# Patient Record
Sex: MALE | Race: OTHER | HISPANIC OR LATINO
[De-identification: names, ages, dates, MRNs, and addresses within clinical notes are randomized per-mention and may not be internally consistent; named-entity substitution may affect disease eponyms.]

---

## 2019-11-20 ENCOUNTER — APPOINTMENT (OUTPATIENT)
Dept: INTERVENTIONAL RADIOLOGY/VASCULAR | Facility: HOSPITAL | Age: 54
End: 2019-11-20
Payer: COMMERCIAL

## 2019-11-20 ENCOUNTER — OUTPATIENT (OUTPATIENT)
Dept: OUTPATIENT SERVICES | Facility: HOSPITAL | Age: 54
LOS: 1 days | End: 2019-11-20
Payer: COMMERCIAL

## 2019-11-20 PROCEDURE — 36590 REMOVAL TUNNELED CV CATH: CPT

## 2020-03-24 ENCOUNTER — EMERGENCY (EMERGENCY)
Facility: HOSPITAL | Age: 55
LOS: 1 days | Discharge: HOME | End: 2020-03-24
Attending: EMERGENCY MEDICINE | Admitting: EMERGENCY MEDICINE
Payer: COMMERCIAL

## 2020-03-24 VITALS
TEMPERATURE: 100 F | RESPIRATION RATE: 17 BRPM | HEART RATE: 103 BPM | OXYGEN SATURATION: 95 % | SYSTOLIC BLOOD PRESSURE: 123 MMHG | DIASTOLIC BLOOD PRESSURE: 80 MMHG

## 2020-03-24 VITALS
OXYGEN SATURATION: 97 % | HEIGHT: 74 IN | RESPIRATION RATE: 19 BRPM | HEART RATE: 109 BPM | DIASTOLIC BLOOD PRESSURE: 90 MMHG | WEIGHT: 285.06 LBS | SYSTOLIC BLOOD PRESSURE: 126 MMHG | TEMPERATURE: 99 F

## 2020-03-24 DIAGNOSIS — Z91.09 OTHER ALLERGY STATUS, OTHER THAN TO DRUGS AND BIOLOGICAL SUBSTANCES: ICD-10-CM

## 2020-03-24 DIAGNOSIS — Z91.013 ALLERGY TO SEAFOOD: ICD-10-CM

## 2020-03-24 DIAGNOSIS — R10.11 RIGHT UPPER QUADRANT PAIN: ICD-10-CM

## 2020-03-24 DIAGNOSIS — J01.80 OTHER ACUTE SINUSITIS: ICD-10-CM

## 2020-03-24 DIAGNOSIS — Z91.041 RADIOGRAPHIC DYE ALLERGY STATUS: ICD-10-CM

## 2020-03-24 DIAGNOSIS — I10 ESSENTIAL (PRIMARY) HYPERTENSION: ICD-10-CM

## 2020-03-24 DIAGNOSIS — B34.9 VIRAL INFECTION, UNSPECIFIED: ICD-10-CM

## 2020-03-24 DIAGNOSIS — R63.0 ANOREXIA: ICD-10-CM

## 2020-03-24 LAB
ALBUMIN SERPL ELPH-MCNC: 3.8 G/DL — SIGNIFICANT CHANGE UP (ref 3.3–5)
ALBUMIN SERPL ELPH-MCNC: 3.9 G/DL — SIGNIFICANT CHANGE UP (ref 3.3–5)
ALP SERPL-CCNC: 84 U/L — SIGNIFICANT CHANGE UP (ref 40–120)
ALP SERPL-CCNC: SIGNIFICANT CHANGE UP U/L (ref 40–120)
ALT FLD-CCNC: 25 U/L — SIGNIFICANT CHANGE UP (ref 10–45)
ALT FLD-CCNC: SIGNIFICANT CHANGE UP U/L (ref 10–45)
ANION GAP SERPL CALC-SCNC: 12 MMOL/L — SIGNIFICANT CHANGE UP (ref 5–17)
ANION GAP SERPL CALC-SCNC: 13 MMOL/L — SIGNIFICANT CHANGE UP (ref 5–17)
AST SERPL-CCNC: 26 U/L — SIGNIFICANT CHANGE UP (ref 10–40)
AST SERPL-CCNC: SIGNIFICANT CHANGE UP U/L (ref 10–40)
BASOPHILS # BLD AUTO: 0.01 K/UL — SIGNIFICANT CHANGE UP (ref 0–0.2)
BASOPHILS NFR BLD AUTO: 0.2 % — SIGNIFICANT CHANGE UP (ref 0–2)
BILIRUB SERPL-MCNC: 0.4 MG/DL — SIGNIFICANT CHANGE UP (ref 0.2–1.2)
BILIRUB SERPL-MCNC: 0.5 MG/DL — SIGNIFICANT CHANGE UP (ref 0.2–1.2)
BUN SERPL-MCNC: 12 MG/DL — SIGNIFICANT CHANGE UP (ref 7–23)
BUN SERPL-MCNC: 13 MG/DL — SIGNIFICANT CHANGE UP (ref 7–23)
CALCIUM SERPL-MCNC: 8.3 MG/DL — LOW (ref 8.4–10.5)
CALCIUM SERPL-MCNC: 8.5 MG/DL — SIGNIFICANT CHANGE UP (ref 8.4–10.5)
CHLORIDE SERPL-SCNC: 106 MMOL/L — SIGNIFICANT CHANGE UP (ref 96–108)
CHLORIDE SERPL-SCNC: 107 MMOL/L — SIGNIFICANT CHANGE UP (ref 96–108)
CO2 SERPL-SCNC: 19 MMOL/L — LOW (ref 22–31)
CO2 SERPL-SCNC: 21 MMOL/L — LOW (ref 22–31)
CREAT SERPL-MCNC: 1.09 MG/DL — SIGNIFICANT CHANGE UP (ref 0.5–1.3)
CREAT SERPL-MCNC: 1.12 MG/DL — SIGNIFICANT CHANGE UP (ref 0.5–1.3)
EOSINOPHIL # BLD AUTO: 0.03 K/UL — SIGNIFICANT CHANGE UP (ref 0–0.5)
EOSINOPHIL NFR BLD AUTO: 0.7 % — SIGNIFICANT CHANGE UP (ref 0–6)
GLUCOSE SERPL-MCNC: 95 MG/DL — SIGNIFICANT CHANGE UP (ref 70–99)
GLUCOSE SERPL-MCNC: 97 MG/DL — SIGNIFICANT CHANGE UP (ref 70–99)
HCT VFR BLD CALC: 49.3 % — SIGNIFICANT CHANGE UP (ref 39–50)
HGB BLD-MCNC: 15.8 G/DL — SIGNIFICANT CHANGE UP (ref 13–17)
IMM GRANULOCYTES NFR BLD AUTO: 0.4 % — SIGNIFICANT CHANGE UP (ref 0–1.5)
LIDOCAIN IGE QN: 47 U/L — SIGNIFICANT CHANGE UP (ref 7–60)
LYMPHOCYTES # BLD AUTO: 1.39 K/UL — SIGNIFICANT CHANGE UP (ref 1–3.3)
LYMPHOCYTES # BLD AUTO: 31 % — SIGNIFICANT CHANGE UP (ref 13–44)
MCHC RBC-ENTMCNC: 24.8 PG — LOW (ref 27–34)
MCHC RBC-ENTMCNC: 32 GM/DL — SIGNIFICANT CHANGE UP (ref 32–36)
MCV RBC AUTO: 77.4 FL — LOW (ref 80–100)
MONOCYTES # BLD AUTO: 0.39 K/UL — SIGNIFICANT CHANGE UP (ref 0–0.9)
MONOCYTES NFR BLD AUTO: 8.7 % — SIGNIFICANT CHANGE UP (ref 2–14)
NEUTROPHILS # BLD AUTO: 2.65 K/UL — SIGNIFICANT CHANGE UP (ref 1.8–7.4)
NEUTROPHILS NFR BLD AUTO: 59 % — SIGNIFICANT CHANGE UP (ref 43–77)
NRBC # BLD: 0 /100 WBCS — SIGNIFICANT CHANGE UP (ref 0–0)
PLATELET # BLD AUTO: 188 K/UL — SIGNIFICANT CHANGE UP (ref 150–400)
POTASSIUM SERPL-MCNC: 4.5 MMOL/L — SIGNIFICANT CHANGE UP (ref 3.5–5.3)
POTASSIUM SERPL-MCNC: SIGNIFICANT CHANGE UP MMOL/L (ref 3.5–5.3)
POTASSIUM SERPL-SCNC: 4.5 MMOL/L — SIGNIFICANT CHANGE UP (ref 3.5–5.3)
POTASSIUM SERPL-SCNC: SIGNIFICANT CHANGE UP MMOL/L (ref 3.5–5.3)
PROT SERPL-MCNC: 7.7 G/DL — SIGNIFICANT CHANGE UP (ref 6–8.3)
PROT SERPL-MCNC: 8 G/DL — SIGNIFICANT CHANGE UP (ref 6–8.3)
RBC # BLD: 6.37 M/UL — HIGH (ref 4.2–5.8)
RBC # FLD: 15.8 % — HIGH (ref 10.3–14.5)
SODIUM SERPL-SCNC: 138 MMOL/L — SIGNIFICANT CHANGE UP (ref 135–145)
SODIUM SERPL-SCNC: 140 MMOL/L — SIGNIFICANT CHANGE UP (ref 135–145)
WBC # BLD: 4.49 K/UL — SIGNIFICANT CHANGE UP (ref 3.8–10.5)
WBC # FLD AUTO: 4.49 K/UL — SIGNIFICANT CHANGE UP (ref 3.8–10.5)

## 2020-03-24 PROCEDURE — 83690 ASSAY OF LIPASE: CPT

## 2020-03-24 PROCEDURE — 76705 ECHO EXAM OF ABDOMEN: CPT | Mod: 26

## 2020-03-24 PROCEDURE — 96374 THER/PROPH/DIAG INJ IV PUSH: CPT

## 2020-03-24 PROCEDURE — 85025 COMPLETE CBC W/AUTO DIFF WBC: CPT

## 2020-03-24 PROCEDURE — 76705 ECHO EXAM OF ABDOMEN: CPT

## 2020-03-24 PROCEDURE — 36415 COLL VENOUS BLD VENIPUNCTURE: CPT

## 2020-03-24 PROCEDURE — 99284 EMERGENCY DEPT VISIT MOD MDM: CPT | Mod: 25

## 2020-03-24 PROCEDURE — 99284 EMERGENCY DEPT VISIT MOD MDM: CPT

## 2020-03-24 PROCEDURE — 80053 COMPREHEN METABOLIC PANEL: CPT

## 2020-03-24 RX ORDER — ONDANSETRON 8 MG/1
4 TABLET, FILM COATED ORAL ONCE
Refills: 0 | Status: COMPLETED | OUTPATIENT
Start: 2020-03-24 | End: 2020-03-24

## 2020-03-24 RX ORDER — CLARITHROMYCIN 500 MG
2 TABLET ORAL
Qty: 6 | Refills: 0
Start: 2020-03-24 | End: 2020-03-28

## 2020-03-24 RX ORDER — SODIUM CHLORIDE 9 MG/ML
1000 INJECTION INTRAMUSCULAR; INTRAVENOUS; SUBCUTANEOUS ONCE
Refills: 0 | Status: COMPLETED | OUTPATIENT
Start: 2020-03-24 | End: 2020-03-24

## 2020-03-24 RX ADMIN — SODIUM CHLORIDE 1000 MILLILITER(S): 9 INJECTION INTRAMUSCULAR; INTRAVENOUS; SUBCUTANEOUS at 09:47

## 2020-03-24 RX ADMIN — ONDANSETRON 4 MILLIGRAM(S): 8 TABLET, FILM COATED ORAL at 10:23

## 2020-03-24 NOTE — ED PROVIDER NOTE - NSFOLLOWUPINSTRUCTIONS_ED_ALL_ED_FT
Viral Respiratory Infection    A viral respiratory infection is an illness that affects parts of the body used for breathing, like the lungs, nose, and throat. It is caused by a germ called a virus. Symptoms can include runny nose, coughing, sneezing, fatigue, body aches, sore throat, fever, or headache. Over the counter medicine can be used to manage the symptoms but the infection typically goes away on its own in 5 to 10 days.     SEEK IMMEDIATE MEDICAL CARE IF YOU HAVE ANY OF THE FOLLOWING SYMPTOMS: shortness of breath, chest pain, fever over 10 days, or lightheadedness/dizziness.     Sinusitis, Adult  Sinusitis is inflammation of your sinuses. Sinuses are hollow spaces in the bones around your face. Your sinuses are located:  Around your eyes.In the middle of your forehead.Behind your nose.In your cheekbones.Mucus normally drains out of your sinuses. When your nasal tissues become inflamed or swollen, mucus can become trapped or blocked. This allows bacteria, viruses, and fungi to grow, which leads to infection. Most infections of the sinuses are caused by a virus.  Sinusitis can develop quickly. It can last for up to 4 weeks (acute) or for more than 12 weeks (chronic). Sinusitis often develops after a cold.  What are the causes?  This condition is caused by anything that creates swelling in the sinuses or stops mucus from draining. This includes:  Allergies.Asthma.Infection from bacteria or viruses.Deformities or blockages in your nose or sinuses.Abnormal growths in the nose (nasal polyps).Pollutants, such as chemicals or irritants in the air.Infection from fungi (rare).What increases the risk?  You are more likely to develop this condition if you:  Have a weak body defense system (immune system).Do a lot of swimming or diving.Overuse nasal sprays.Smoke.What are the signs or symptoms?  The main symptoms of this condition are pain and a feeling of pressure around the affected sinuses. Other symptoms include:  Stuffy nose or congestion.Thick drainage from your nose.Swelling and warmth over the affected sinuses.Headache.Upper toothache.A cough that may get worse at night.Extra mucus that collects in the throat or the back of the nose (postnasal drip).Decreased sense of smell and taste.Fatigue.A fever.Sore throat.Bad breath.How is this diagnosed?  This condition is diagnosed based on:  Your symptoms.Your medical history.A physical exam.Tests to find out if your condition is acute or chronic. This may include:  Checking your nose for nasal polyps.Viewing your sinuses using a device that has a light (endoscope).Testing for allergies or bacteria.Imaging tests, such as an MRI or CT scan.In rare cases, a bone biopsy may be done to rule out more serious types of fungal sinus disease.  How is this treated?  Treatment for sinusitis depends on the cause and whether your condition is chronic or acute.  If caused by a virus, your symptoms should go away on their own within 10 days. You may be given medicines to relieve symptoms. They include:  Medicines that shrink swollen nasal passages (topical intranasal decongestants). Medicines that treat allergies (antihistamines).A spray that eases inflammation of the nostrils (topical intranasal corticosteroids).Rinses that help get rid of thick mucus in your nose (nasal saline washes).If caused by bacteria, your health care provider may recommend waiting to see if your symptoms improve. Most bacterial infections will get better without antibiotic medicine. You may be given antibiotics if you have:  A severe infection. A weak immune system.If caused by narrow nasal passages or nasal polyps, you may need to have surgery.Follow these instructions at home:  Medicines     Take, use, or apply over-the-counter and prescription medicines only as told by your health care provider. These may include nasal sprays.If you were prescribed an antibiotic medicine, take it as told by your health care provider. Do not stop taking the antibiotic even if you start to feel better.Hydrate and humidify        Drink enough fluid to keep your urine pale yellow. Staying hydrated will help to thin your mucus.Use a cool mist humidifier to keep the humidity level in your home above 50%.Inhale steam for 10–15 minutes, 3–4 times a day, or as told by your health care provider. You can do this in the bathroom while a hot shower is running.Limit your exposure to cool or dry air.Rest     Rest as much as possible.Sleep with your head raised (elevated).Make sure you get enough sleep each night.General instructions        Apply a warm, moist washcloth to your face 3–4 times a day or as told by your health care provider. This will help with discomfort.Wash your hands often with soap and water to reduce your exposure to germs. If soap and water are not available, use hand .Do not smoke. Avoid being around people who are smoking (secondhand smoke).Keep all follow-up visits as told by your health care provider. This is important.Contact a health care provider if:  You have a fever.Your symptoms get worse.Your symptoms do not improve within 10 days.Get help right away if:  You have a severe headache.You have persistent vomiting.You have severe pain or swelling around your face or eyes.You have vision problems.You develop confusion.Your neck is stiff.You have trouble breathing.Summary  Sinusitis is soreness and inflammation of your sinuses. Sinuses are hollow spaces in the bones around your face.This condition is caused by nasal tissues that become inflamed or swollen. The swelling traps or blocks the flow of mucus. This allows bacteria, viruses, and fungi to grow, which leads to infection.If you were prescribed an antibiotic medicine, take it as told by your health care provider. Do not stop taking the antibiotic even if you start to feel better.Keep all follow-up visits as told by your health care provider. This is important.This information is not intended to replace advice given to you by your health care provider. Make sure you discuss any questions you have with your health care provider.    Document Released: 12/18/2006 Document Revised: 05/20/2019 Document Reviewed: 05/20/2019  [email or web address] Interactive Patient Education © 2020 [email or web address]    Follow up with your doctor in 1 week.    Return for shortness of breath, chest pain, weakness, worsening of your condition.

## 2020-03-24 NOTE — ED PROVIDER NOTE - PATIENT PORTAL LINK FT
You can access the FollowMyHealth Patient Portal offered by [facility] by registering at the following website: [email or web address]. By joining [employer]’s FollowMyHealth portal, you will also be able to view your health information using other applications (apps) compatible with our system.

## 2020-03-24 NOTE — ED ADULT NURSE NOTE - OBJECTIVE STATEMENT
Pt presents to ED with c/o R sided abdominal pain x six days, states he cannot keep anything down, denies diarrhea, fever, cough, chest pain, SOB, hx of abdominal surgeries.

## 2020-03-24 NOTE — ED ADULT TRIAGE NOTE - CHIEF COMPLAINT QUOTE
"I have been having abd pain, nausea, vomiting, chills and fevers for about 1 week, there is a mild cough too".

## 2020-03-24 NOTE — ED PROVIDER NOTE - OBJECTIVE STATEMENT
53 y/o M with PMHx HTN, heartburn and lymphoma in 2015 (currently in remission), presents to the ED with decreased PO intake, nausea with eating, dry heaving and constant RUQ abd pain with the feeling of a "rock" in his RUQ x 6 days. Pt also notes chills, but denies fever, CP, SOB, cough, or diarrhea. No past abdominal surgeries. No smoking hx or EtOH use.

## 2020-03-24 NOTE — ED PROVIDER NOTE - CLINICAL SUMMARY MEDICAL DECISION MAKING FREE TEXT BOX
53 y/o M presenting to the ED with 6 days of decreased PO intake, nausea after meals and feeling of a "rock" in his RUQ. Pt is afebrile, VSS, pt appears well. Tenderness to RUQ, concerning for cholelithiasis vs acute cholecystitis vs other hepatobiliary pathology. Plan for labs, RUQ sono, IV fluids, Zofran and reassess. Do not suspect COVID-19.

## 2020-03-24 NOTE — ED PROVIDER NOTE - CARE PLAN
Principal Discharge DX:	Viral syndrome  Secondary Diagnosis:	Acute non-recurrent sinusitis of other sinus

## 2020-03-24 NOTE — ED ADULT NURSE NOTE - CAS TRG GENERAL AIRWAY, MLM
Prescription approved per [facility] Refill Protocol.  Jeffery Thomas RN     Patent

## 2020-03-24 NOTE — ED PROVIDER NOTE - GASTROINTESTINAL, MLM
Abdomen soft, obese, focal tenderness to RUQ, no guarding, no rebound, no palpable masses, no hernias, no surgical scars.

## 2020-09-25 ENCOUNTER — EMERGENCY (EMERGENCY)
Facility: HOSPITAL | Age: 55
LOS: 1 days | Discharge: HOME | End: 2020-09-25
Attending: EMERGENCY MEDICINE | Admitting: EMERGENCY MEDICINE
Payer: COMMERCIAL

## 2020-09-25 VITALS
RESPIRATION RATE: 18 BRPM | WEIGHT: 289.91 LBS | OXYGEN SATURATION: 97 % | SYSTOLIC BLOOD PRESSURE: 115 MMHG | HEIGHT: 74 IN | TEMPERATURE: 98 F | HEART RATE: 96 BPM | DIASTOLIC BLOOD PRESSURE: 75 MMHG

## 2020-09-25 PROCEDURE — 99283 EMERGENCY DEPT VISIT LOW MDM: CPT | Mod: 25

## 2020-09-25 PROCEDURE — 90471 IMMUNIZATION ADMIN: CPT

## 2020-09-25 PROCEDURE — 12001 RPR S/N/AX/GEN/TRNK 2.5CM/<: CPT

## 2020-09-25 PROCEDURE — 90715 TDAP VACCINE 7 YRS/> IM: CPT

## 2020-09-25 RX ORDER — BACITRACIN ZINC 500 UNIT/G
1 OINTMENT IN PACKET (EA) TOPICAL ONCE
Refills: 0 | Status: COMPLETED | OUTPATIENT
Start: 2020-09-25 | End: 2020-09-25

## 2020-09-25 RX ORDER — LIDOCAINE HCL 20 MG/ML
5 VIAL (ML) INJECTION ONCE
Refills: 0 | Status: COMPLETED | OUTPATIENT
Start: 2020-09-25 | End: 2020-09-25

## 2020-09-25 RX ORDER — TETANUS TOXOID, REDUCED DIPHTHERIA TOXOID AND ACELLULAR PERTUSSIS VACCINE, ADSORBED 5; 2.5; 8; 8; 2.5 [IU]/.5ML; [IU]/.5ML; UG/.5ML; UG/.5ML; UG/.5ML
0.5 SUSPENSION INTRAMUSCULAR ONCE
Refills: 0 | Status: COMPLETED | OUTPATIENT
Start: 2020-09-25 | End: 2020-09-25

## 2020-09-25 RX ADMIN — TETANUS TOXOID, REDUCED DIPHTHERIA TOXOID AND ACELLULAR PERTUSSIS VACCINE, ADSORBED 0.5 MILLILITER(S): 5; 2.5; 8; 8; 2.5 SUSPENSION INTRAMUSCULAR at 19:08

## 2020-09-25 RX ADMIN — Medication 1 APPLICATION(S): at 19:21

## 2020-09-25 RX ADMIN — Medication 5 MILLILITER(S): at 19:21

## 2020-09-25 NOTE — ED PROVIDER NOTE - OBJECTIVE STATEMENT
55 y/o male who is right hand dominant is present c/o laceration to his left index finger. He sustained injury approximately 30 minutes prior to ER arrival. He was cleaning a knife when he accidently cut his finger. He was able to control the bleeding prior to arrival with pressure, elevation and paper towels. He denies the following: numbness/tingling sensation, and last tdap is unknown.

## 2020-09-25 NOTE — ED PROVIDER NOTE - NSFOLLOWUPINSTRUCTIONS_ED_ALL_ED_FT
Please keep bandages on for the next 24-48 hours and apply bacitracin twice daily to wound. Please return to the ED in 7 days for suture removal. Return to the Emergency Department if you have any new or worsening symptoms, or if you have any concerns.          Laceration    WHAT YOU NEED TO KNOW:    A laceration is an injury to the skin and the soft tissue underneath it. Lacerations can happen anywhere on the body.    DISCHARGE INSTRUCTIONS:    Return to the emergency department if:   •You have heavy bleeding or bleeding that does not stop after 10 minutes of holding firm, direct pressure over the wound.      •Your wound opens up.      Call your doctor if:   •You have a fever or chills.      •Your laceration is red, warm, or swollen.      •You have red streaks on your skin coming from your wound.      •You have white or yellow drainage from the wound that smells bad.      •You have pain that gets worse, even after treatment.      •You have questions or concerns about your condition or care.      Medicines: You may need any of the following:   •Prescription pain medicine may be given. Ask your healthcare provider how to take this medicine safely. Some prescription pain medicines contain acetaminophen. Do not take other medicines that contain acetaminophen without talking to your healthcare provider. Too much acetaminophen may cause liver damage. Prescription pain medicine may cause constipation. Ask your healthcare provider how to prevent or treat constipation.       •Antibiotics help treat or prevent a bacterial infection.      •Take your medicine as directed. Contact your healthcare provider if you think your medicine is not helping or if you have side effects. Tell him or her if you are allergic to any medicine. Keep a list of the medicines, vitamins, and herbs you take. Include the amounts, and when and why you take them. Bring the list or the pill bottles to follow-up visits. Carry your medicine list with you in case of an emergency.      Care for your wound as directed:   •Do not get your wound wet until your healthcare provider says it is okay. Do not soak your wound in water. Do not go swimming until your healthcare provider says it is okay. Carefully wash the wound with soap and water. Gently pat the area dry or allow it to air dry.      •Change your bandages when they get wet, dirty, or after washing. Apply new, clean bandages as directed. Do not apply elastic bandages or tape too tight. Do not put powders or lotions over your incision.      •Apply antibiotic ointment as directed. Your healthcare provider may give you antibiotic ointment to put over your wound if you have stitches. If you have strips of tape over your incision, let them dry up and fall off on their own. If they do not fall off within 14 days, gently remove them. If you have glue over your wound, do not remove or pick at it. If your glue comes off, do not replace it with glue that you have at home.      •Check your wound every day for signs of infection, such as swelling, redness, or pus.      Self-care:   •Apply ice on your wound for 15 to 20 minutes every hour or as directed. Use an ice pack, or put crushed ice in a plastic bag. Cover it with a towel. Ice helps prevent tissue damage and decreases swelling and pain.      •Use a splint as directed. A splint will decrease movement and stress on your wound. It may help it heal faster. A splint may be used for lacerations over joints or areas of your body that bend. Ask your healthcare provider how to apply and remove a splint.      •Decrease scarring of your wound by applying ointments as directed. Do not apply ointments until your healthcare provider says it is okay. You may need to wait until your wound is healed. Ask which ointment to buy and how often to use it. After your wound is healed, use sunscreen over the area when you are out in the sun. You should do this for at least 6 months to 1 year after your injury.      Follow up with your doctor as directed: You may need to follow up in 24 to 48 hours to have your wound checked for infection. You will need to return in 3 to 14 days if you have stitches or staples so they can be removed. Care for your wound as directed to prevent infection and help it heal. Write down your questions so you remember to ask them during your visits.       © Copyright [employer] 2020           back to top                          © Copyright [employer] 2020

## 2020-09-25 NOTE — ED PROVIDER NOTE - PSYCHIATRIC, MLM
Appointment made   Alert and oriented to person, place, time/situation. normal mood and affect. no apparent risk to self or others.

## 2020-09-25 NOTE — ED PROVIDER NOTE - ATTENDING CONTRIBUTION TO CARE
54 year old male presents to ED with concern for laceration to left hand.  He was cleaning a knife when he accidently cut himself.  On my face to face ED eval, patient with 2 cm laceration of left palmar aspect of index finger - linear; hemostasis achieved.  Neurovasc status intact, cap refill <5 seconds.  Will update tetanus, repair wound and dispo accordingly.

## 2020-09-25 NOTE — ED ADULT NURSE NOTE - OBJECTIVE STATEMENT
Pt is a 55 y/o male A&Ox4 in NAD ambulatory with steady gait c/o laceration to left second finger while cleaning his knife. Bleeding controlled with dressing.

## 2020-09-25 NOTE — ED PROVIDER NOTE - CLINICAL SUMMARY MEDICAL DECISION MAKING FREE TEXT BOX
55 y/o male with 2 cm laceration located on the palmar aspect of the left index finger sustained with a knife at home. Not intentional. Good rom with flexion/extension. Do not suspect tendon injury. Repair with x5 stitches. Given wound care instructions and follow up in 7 days for removal. I have discussed the discharge plan with the patient. The patient agrees with the plan, as discussed.  The patient understands Emergency Department diagnosis is a preliminary diagnosis often based on limited information and that the patient must adhere to the follow-up plan as discussed.  The patient understands that if the symptoms worsen or if prescribed medications do not have the desired/planned effect that the patient may return to the Emergency Department at any time for further evaluation and treatment.

## 2020-09-25 NOTE — ED PROVIDER NOTE - PATIENT PORTAL LINK FT
You can access the FollowMyHealth Patient Portal offered by [facility] by registering at the following website: [email or web address]. By joining [employer]’s FollowMyHealth portal, you will also be able to view your health information using other applications (apps) compatible with our system.

## 2020-09-25 NOTE — ED ADULT TRIAGE NOTE - HEIGHT IN FEET
Quality 226: Preventive Care And Screening: Tobacco Use: Screening And Cessation Intervention: Patient screened for tobacco and never smoked Quality 110: Preventive Care And Screening: Influenza Immunization: Influenza Immunization Administered during Influenza season Quality 111:Pneumonia Vaccination Status For Older Adults: Pneumococcal Vaccination not Administered or Previously Received, Reason not Otherwise Specified Quality 131: Pain Assessment And Follow-Up: Pain assessment using a standardized tool is documented as negative, no follow-up plan required Detail Level: Detailed Quality 431: Preventive Care And Screening: Unhealthy Alcohol Use - Screening: Patient screened for unhealthy alcohol use using a single question and scores less than 2 times per year Quality 130: Documentation Of Current Medications In The Medical Record: Current Medications Documented 6

## 2020-09-25 NOTE — ED PROVIDER NOTE - CARE PLAN
Principal Discharge DX:	Laceration of left index finger without foreign body without damage to nail, initial encounter

## 2020-09-26 PROBLEM — C85.90 NON-HODGKIN LYMPHOMA, UNSPECIFIED, UNSPECIFIED SITE: Chronic | Status: ACTIVE | Noted: 2020-03-24

## 2020-09-26 PROBLEM — I10 ESSENTIAL (PRIMARY) HYPERTENSION: Chronic | Status: ACTIVE | Noted: 2020-03-24

## 2020-09-29 DIAGNOSIS — I10 ESSENTIAL (PRIMARY) HYPERTENSION: ICD-10-CM
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
## 2020-09-29 DIAGNOSIS — Z91.041 RADIOGRAPHIC DYE ALLERGY STATUS: ICD-10-CM

## 2020-09-29 DIAGNOSIS — Z79.899 OTHER LONG TERM (CURRENT) DRUG THERAPY: ICD-10-CM

## 2020-09-29 DIAGNOSIS — Z23 ENCOUNTER FOR IMMUNIZATION: ICD-10-CM

## 2020-09-29 DIAGNOSIS — S61.211A LACERATION WITHOUT FOREIGN BODY OF LEFT INDEX FINGER WITHOUT DAMAGE TO NAIL, INITIAL ENCOUNTER: ICD-10-CM

## 2020-09-29 DIAGNOSIS — Z91.013 ALLERGY TO SEAFOOD: ICD-10-CM
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
## 2021-05-17 NOTE — ED PROVIDER NOTE - RELIEVING FACTORS
1.  Patient is to continue his current diet, medication and activity. 2.  Patient is to push fluids. 3.  I will obtain a chest x-ray on the patient. 4.  I will obtain a CBC and BMP on the patient. 5.  I will obtain a nasal swab to check for Covid.   6. none

## 2021-05-26 ENCOUNTER — INPATIENT (INPATIENT)
Facility: HOSPITAL | Age: 56
LOS: 6 days | Discharge: HOME | DRG: 392 | End: 2021-06-02
Attending: SURGERY | Admitting: SURGERY
Payer: COMMERCIAL

## 2021-05-26 VITALS
DIASTOLIC BLOOD PRESSURE: 82 MMHG | RESPIRATION RATE: 18 BRPM | SYSTOLIC BLOOD PRESSURE: 117 MMHG | WEIGHT: 300.05 LBS | HEART RATE: 96 BPM | TEMPERATURE: 98 F | OXYGEN SATURATION: 97 % | HEIGHT: 74 IN

## 2021-05-26 LAB
ALBUMIN SERPL ELPH-MCNC: 4.2 G/DL — SIGNIFICANT CHANGE UP (ref 3.3–5)
ALP SERPL-CCNC: 85 U/L — SIGNIFICANT CHANGE UP (ref 40–120)
ALT FLD-CCNC: 29 U/L — SIGNIFICANT CHANGE UP (ref 10–45)
ANION GAP SERPL CALC-SCNC: 14 MMOL/L — SIGNIFICANT CHANGE UP (ref 5–17)
APPEARANCE UR: CLEAR — SIGNIFICANT CHANGE UP
AST SERPL-CCNC: 30 U/L — SIGNIFICANT CHANGE UP (ref 10–40)
BACTERIA # UR AUTO: PRESENT /HPF
BASOPHILS # BLD AUTO: 0.02 K/UL — SIGNIFICANT CHANGE UP (ref 0–0.2)
BASOPHILS NFR BLD AUTO: 0.4 % — SIGNIFICANT CHANGE UP (ref 0–2)
BILIRUB SERPL-MCNC: 0.6 MG/DL — SIGNIFICANT CHANGE UP (ref 0.2–1.2)
BILIRUB UR-MCNC: NEGATIVE — SIGNIFICANT CHANGE UP
BUN SERPL-MCNC: 15 MG/DL — SIGNIFICANT CHANGE UP (ref 7–23)
CALCIUM SERPL-MCNC: 8.9 MG/DL — SIGNIFICANT CHANGE UP (ref 8.4–10.5)
CHLORIDE SERPL-SCNC: 104 MMOL/L — SIGNIFICANT CHANGE UP (ref 96–108)
CO2 SERPL-SCNC: 23 MMOL/L — SIGNIFICANT CHANGE UP (ref 22–31)
COLOR SPEC: YELLOW — SIGNIFICANT CHANGE UP
CREAT SERPL-MCNC: 1.12 MG/DL — SIGNIFICANT CHANGE UP (ref 0.5–1.3)
DIFF PNL FLD: NEGATIVE — SIGNIFICANT CHANGE UP
EOSINOPHIL # BLD AUTO: 0.01 K/UL — SIGNIFICANT CHANGE UP (ref 0–0.5)
EOSINOPHIL NFR BLD AUTO: 0.2 % — SIGNIFICANT CHANGE UP (ref 0–6)
EPI CELLS # UR: SIGNIFICANT CHANGE UP /HPF (ref 0–5)
GLUCOSE SERPL-MCNC: 98 MG/DL — SIGNIFICANT CHANGE UP (ref 70–99)
GLUCOSE UR QL: NEGATIVE — SIGNIFICANT CHANGE UP
HCT VFR BLD CALC: 43.9 % — SIGNIFICANT CHANGE UP (ref 39–50)
HGB BLD-MCNC: 14.6 G/DL — SIGNIFICANT CHANGE UP (ref 13–17)
IMM GRANULOCYTES NFR BLD AUTO: 0 % — SIGNIFICANT CHANGE UP (ref 0–1.5)
KETONES UR-MCNC: 15 MG/DL
LEUKOCYTE ESTERASE UR-ACNC: NEGATIVE — SIGNIFICANT CHANGE UP
LIDOCAIN IGE QN: 37 U/L — SIGNIFICANT CHANGE UP (ref 7–60)
LYMPHOCYTES # BLD AUTO: 1.55 K/UL — SIGNIFICANT CHANGE UP (ref 1–3.3)
LYMPHOCYTES # BLD AUTO: 28.3 % — SIGNIFICANT CHANGE UP (ref 13–44)
MCHC RBC-ENTMCNC: 25.7 PG — LOW (ref 27–34)
MCHC RBC-ENTMCNC: 33.3 GM/DL — SIGNIFICANT CHANGE UP (ref 32–36)
MCV RBC AUTO: 77.3 FL — LOW (ref 80–100)
MONOCYTES # BLD AUTO: 0.54 K/UL — SIGNIFICANT CHANGE UP (ref 0–0.9)
MONOCYTES NFR BLD AUTO: 9.9 % — SIGNIFICANT CHANGE UP (ref 2–14)
NEUTROPHILS # BLD AUTO: 3.35 K/UL — SIGNIFICANT CHANGE UP (ref 1.8–7.4)
NEUTROPHILS NFR BLD AUTO: 61.2 % — SIGNIFICANT CHANGE UP (ref 43–77)
NITRITE UR-MCNC: NEGATIVE — SIGNIFICANT CHANGE UP
NRBC # BLD: 0 /100 WBCS — SIGNIFICANT CHANGE UP (ref 0–0)
PH UR: 6 — SIGNIFICANT CHANGE UP (ref 5–8)
PLATELET # BLD AUTO: 178 K/UL — SIGNIFICANT CHANGE UP (ref 150–400)
POTASSIUM SERPL-MCNC: 4.2 MMOL/L — SIGNIFICANT CHANGE UP (ref 3.5–5.3)
POTASSIUM SERPL-SCNC: 4.2 MMOL/L — SIGNIFICANT CHANGE UP (ref 3.5–5.3)
PROT SERPL-MCNC: 8.1 G/DL — SIGNIFICANT CHANGE UP (ref 6–8.3)
PROT UR-MCNC: ABNORMAL MG/DL
RBC # BLD: 5.68 M/UL — SIGNIFICANT CHANGE UP (ref 4.2–5.8)
RBC # FLD: 15.2 % — HIGH (ref 10.3–14.5)
RBC CASTS # UR COMP ASSIST: < 5 /HPF — SIGNIFICANT CHANGE UP
SARS-COV-2 RNA SPEC QL NAA+PROBE: SIGNIFICANT CHANGE UP
SODIUM SERPL-SCNC: 141 MMOL/L — SIGNIFICANT CHANGE UP (ref 135–145)
SP GR SPEC: 1.02 — SIGNIFICANT CHANGE UP (ref 1–1.03)
URATE CRY FLD QL MICRO: ABNORMAL /HPF
UROBILINOGEN FLD QL: 0.2 E.U./DL — SIGNIFICANT CHANGE UP
WBC # BLD: 5.47 K/UL — SIGNIFICANT CHANGE UP (ref 3.8–10.5)
WBC # FLD AUTO: 5.47 K/UL — SIGNIFICANT CHANGE UP (ref 3.8–10.5)
WBC UR QL: < 5 /HPF — SIGNIFICANT CHANGE UP

## 2021-05-26 PROCEDURE — 99285 EMERGENCY DEPT VISIT HI MDM: CPT

## 2021-05-26 PROCEDURE — 93010 ELECTROCARDIOGRAM REPORT: CPT

## 2021-05-26 PROCEDURE — 76705 ECHO EXAM OF ABDOMEN: CPT | Mod: 26

## 2021-05-26 RX ORDER — SODIUM CHLORIDE 9 MG/ML
1000 INJECTION INTRAMUSCULAR; INTRAVENOUS; SUBCUTANEOUS ONCE
Refills: 0 | Status: COMPLETED | OUTPATIENT
Start: 2021-05-26 | End: 2021-05-26

## 2021-05-26 RX ORDER — ATORVASTATIN CALCIUM 80 MG/1
20 TABLET, FILM COATED ORAL AT BEDTIME
Refills: 0 | Status: DISCONTINUED | OUTPATIENT
Start: 2021-05-26 | End: 2021-06-02

## 2021-05-26 RX ORDER — SODIUM CHLORIDE 9 MG/ML
1000 INJECTION, SOLUTION INTRAVENOUS
Refills: 0 | Status: DISCONTINUED | OUTPATIENT
Start: 2021-05-26 | End: 2021-06-02

## 2021-05-26 RX ORDER — ONDANSETRON 8 MG/1
4 TABLET, FILM COATED ORAL EVERY 6 HOURS
Refills: 0 | Status: DISCONTINUED | OUTPATIENT
Start: 2021-05-26 | End: 2021-06-02

## 2021-05-26 RX ORDER — ATORVASTATIN CALCIUM 80 MG/1
1 TABLET, FILM COATED ORAL
Qty: 0 | Refills: 0 | DISCHARGE

## 2021-05-26 RX ORDER — HEPARIN SODIUM 5000 [USP'U]/ML
7500 INJECTION INTRAVENOUS; SUBCUTANEOUS EVERY 8 HOURS
Refills: 0 | Status: DISCONTINUED | OUTPATIENT
Start: 2021-05-26 | End: 2021-06-02

## 2021-05-26 RX ORDER — PANTOPRAZOLE SODIUM 20 MG/1
40 TABLET, DELAYED RELEASE ORAL
Refills: 0 | Status: DISCONTINUED | OUTPATIENT
Start: 2021-05-26 | End: 2021-05-26

## 2021-05-26 RX ORDER — MORPHINE SULFATE 50 MG/1
4 CAPSULE, EXTENDED RELEASE ORAL ONCE
Refills: 0 | Status: DISCONTINUED | OUTPATIENT
Start: 2021-05-26 | End: 2021-05-26

## 2021-05-26 RX ORDER — ACETAMINOPHEN 500 MG
650 TABLET ORAL EVERY 6 HOURS
Refills: 0 | Status: DISCONTINUED | OUTPATIENT
Start: 2021-05-26 | End: 2021-06-02

## 2021-05-26 RX ORDER — PANTOPRAZOLE SODIUM 20 MG/1
40 TABLET, DELAYED RELEASE ORAL DAILY
Refills: 0 | Status: DISCONTINUED | OUTPATIENT
Start: 2021-05-26 | End: 2021-05-28

## 2021-05-26 RX ORDER — ONDANSETRON 8 MG/1
4 TABLET, FILM COATED ORAL ONCE
Refills: 0 | Status: COMPLETED | OUTPATIENT
Start: 2021-05-26 | End: 2021-05-26

## 2021-05-26 RX ADMIN — PANTOPRAZOLE SODIUM 40 MILLIGRAM(S): 20 TABLET, DELAYED RELEASE ORAL at 22:45

## 2021-05-26 RX ADMIN — HEPARIN SODIUM 7500 UNIT(S): 5000 INJECTION INTRAVENOUS; SUBCUTANEOUS at 22:45

## 2021-05-26 RX ADMIN — SODIUM CHLORIDE 1000 MILLILITER(S): 9 INJECTION INTRAMUSCULAR; INTRAVENOUS; SUBCUTANEOUS at 07:38

## 2021-05-26 RX ADMIN — HEPARIN SODIUM 7500 UNIT(S): 5000 INJECTION INTRAVENOUS; SUBCUTANEOUS at 15:05

## 2021-05-26 RX ADMIN — ONDANSETRON 4 MILLIGRAM(S): 8 TABLET, FILM COATED ORAL at 07:55

## 2021-05-26 RX ADMIN — MORPHINE SULFATE 4 MILLIGRAM(S): 50 CAPSULE, EXTENDED RELEASE ORAL at 07:55

## 2021-05-26 RX ADMIN — MORPHINE SULFATE 4 MILLIGRAM(S): 50 CAPSULE, EXTENDED RELEASE ORAL at 15:05

## 2021-05-26 RX ADMIN — SODIUM CHLORIDE 1000 MILLILITER(S): 9 INJECTION INTRAMUSCULAR; INTRAVENOUS; SUBCUTANEOUS at 10:15

## 2021-05-26 RX ADMIN — ATORVASTATIN CALCIUM 20 MILLIGRAM(S): 80 TABLET, FILM COATED ORAL at 22:45

## 2021-05-26 RX ADMIN — SODIUM CHLORIDE 176 MILLILITER(S): 9 INJECTION, SOLUTION INTRAVENOUS at 15:05

## 2021-05-26 NOTE — PATIENT PROFILE ADULT - BILL PAYMENT
"Physical Therapy  Treatment    Rosa Phillips   [phone]    Loc Caicedo, PT   5/24/2019   no

## 2021-05-26 NOTE — ED ADULT TRIAGE NOTE - CHIEF COMPLAINT QUOTE
Fever (max temp of 101) and headache since Sunday. RUQ abd pain with radiation to R flank, N/V and malaise since Monday.

## 2021-05-26 NOTE — H&P ADULT - ATTENDING COMMENTS
Patient seen and examined, imaging reviewed.  Mild TTP in RUQ.  Normal WBC, LFTs  No gallstones on USG, CBD dilated to 9mm    A/P: RUQ pain of unclear etiology (chronic).  No evidence of cholelithiasis or cholecystitis, but has dilated CBD.  1. MRI to evaluate pancreas and CBD.  patient states he is anxious in MRIs.  Will use premedication with Ativan.   2. If unsuccessful, obtain CTAP pancreatic protocol.  3. NPO, IVF  4. No abx.

## 2021-05-26 NOTE — ED ADULT NURSE NOTE - NSIMPLEMENTINTERV_GEN_ALL_ED
Implemented All Universal Safety Interventions:  [place] to call system. Call bell, personal items and telephone within reach. Instruct patient to call for assistance. Room bathroom lighting operational. Non-slip footwear when patient is off stretcher. Physically safe environment: no spills, clutter or unnecessary equipment. Stretcher in lowest position, wheels locked, appropriate side rails in place. Benzoyl Peroxide Counseling: Patient counseled that medicine may cause skin irritation and bleach clothing.  In the event of skin irritation, the patient was advised to reduce the amount of the drug applied or use it less frequently.   The patient verbalized understanding of the proper use and possible adverse effects of benzoyl peroxide.  All of the patient's questions and concerns were addressed.

## 2021-05-26 NOTE — H&P ADULT - ASSESSMENT
54 y/o male with PMH s/f Lmphoma in remission(s/p chemo), GERD, HLD presenting to ED with c/o RUQ pain of 4 days duration. Sharp pain to RUQ with nausea and vomiting. RUQ today s/f only CBD dilation to 9 mm.    Plan  - Admit team 4 regional under Dr. Rodrigues  - NPO  - mIVF  - MRCP  - Pain/Nausea/Vomiting control  - Discussed with chief and attending.

## 2021-05-26 NOTE — ED PROVIDER NOTE - ATTENDING CONTRIBUTION TO CARE
56 y/o M with PMH of lymphoma in remission, HTN c/o abd pain x 4 days. Pt states sharp pain to RUQ with nausea and multiple episodes of nbnb vomiting. + fever for the first two days. No diarrhea or constipation. no bloody stool or melena. no urinary sx's. pain radiates to back. no cp or sob. no coughing. no further complaints. VS noted. Pt AAO, NAD, (+) RUQ abd TTP. Pt afebrile in ED. labs noted. no elevated wbc or lfts. us done and dilated CBD. surgery consulted and in ED to see pt and pt admitted to surgery. Stable in ED.

## 2021-05-26 NOTE — ED ADULT NURSE NOTE - OBJECTIVE STATEMENT
Received ambulatory with chief complaints of RUQ abdominal pain, nausea, vomiting, and fever (max of 101). Patient reports symptoms started Sunday. Denies diarrhea, loss of sense of taste and smell.     AOX4, speaking full sentences.  +PERRLA.  Patient denies chest pain.  Resps even and nonlabored. Moves all extremities. No obvious trauma/injury/deformity noted. Patient oriented to ED area. All needs attended. POC reviewed. Hourly rounding in progress.

## 2021-05-26 NOTE — ED PROVIDER NOTE - CLINICAL SUMMARY MEDICAL DECISION MAKING FREE TEXT BOX
abd pain and fever. + tenderness to RUQ on exam. concern for cholecystitis. no RLQ tenderness to suggest appendicitis. vss a febrile in ED. labs noted. no elevated wbc or lfts. us done and dilated CBD. surgery consulted and recommends admission.

## 2021-05-26 NOTE — ED PROVIDER NOTE - OBJECTIVE STATEMENT
56 y/o male with hx of lymphoma in remission, HTN c/o abd pain x 4 days. pt states sharp pain to RUQ with nausea and vomiting. multiple NBNB emesis. + fever first two days. no chills. no diarrhea or constipation. no bloody stool or melena. no urinary sx's. pain radiates to back. no cp or sob. no coughing. no further complaints.

## 2021-05-26 NOTE — H&P ADULT - HISTORY OF PRESENT ILLNESS
56 y/o male with PMH s/f Lmphoma in remission(s/p chemo), GERD, HLD presenting to ED with c/o RUQ pain of 4 days duration. Sharp pain to RUQ with nausea and vomiting. Multiple NBNB emesis. + fever first two days. no chills. no diarrhea or constipation. no bloody stool or melena. no urinary sx's. pain radiates to back. no cp or sob. no coughing. no further complaints. Patient had similar episodes in the past 6 and 12 months ago and RUQ usg both times was WNL. RUQ today s/f only CBD dilation to 9 mm. Last colonoscopy 2 years ago, 2 polyps, all normal.    In the ED, 36.8, 85, 117/82, 18/96% RA. All labs WNL. 2l Bolus

## 2021-05-26 NOTE — H&P ADULT - NSHPLABSRESULTS_GEN_ALL_CORE
EXAM: US ABDOMEN LIMITED    PROCEDURE DATE: 05/26/2021        INTERPRETATION: RIGHT UPPER QUADRANT ULTRASOUND dated 5/26/2021 9:19 AM    INDICATION: Right upper quadrant pain.    PRIOR STUDIES: Limited abdominal ultrasound 3/24/2020    FINDINGS:    Liver: Increased echogenicity with no visible focal abnormality. Enlarged measuring 18.3 cm in length, unchanged.    Intrahepatic ducts: Not dilated.    Common bile duct: Dilated, measuring 0.9 cm.    Gallbladder: No visible gallstones. No wall thickening or pericholecystic fluid.    Pancreas: Poorly visualized.    Abdominal aorta: No abdominal aortic aneurysm is seen.    Inferior vena cava: The visualized portions were normal in appearance.    Right kidney: Length of 11.9 cm. Normal echogenicity. Right renal cysts measuring up to 1.5 cm    Also noted: No other abnormalities were noted.      IMPRESSION:    No evidence of cholelithiasis or acute cholecystitis.    Dilated CBD measuring 0.9 cm. Limited evaluation of the pancreas in this examination. If there is clinical concern for an obstructive process, further imaging to include an MRCP is suggested.            Thank you for the opportunity to participate in the care of this patient.    MAGI SCHNEIDER M.D., RADIOLOGY RESIDENT  This document has been electronically signed.  HEIDY GARCIA MD; Attending Radiologist  This document has been electronically signed. May 26 2021 9:48AM

## 2021-05-26 NOTE — ED ADULT TRIAGE NOTE - NS ED TRIAGE AVPU SCALE
Nonstress Test   Patient: Ted Moncada    Gestation: 35w3d    NST: ama       Variability: Moderate           Accelerations: Yes           Decelerations: None            Baseline: 120 BPM           Uterine Irritability: No           Contractions: Not present
Alert-The patient is alert, awake and responds to voice. The patient is oriented to time, place, and person. The triage nurse is able to obtain subjective information.

## 2021-05-26 NOTE — H&P ADULT - NSHPPHYSICALEXAM_GEN_ALL_CORE
Physical Exam:  General: NAD  Abdominal: soft, mildly distended. Mild TTP in RUQ. Negative for rebound, guarding or peritonitis.   Extremities: WWP

## 2021-05-27 DIAGNOSIS — R03.0 ELEVATED BLOOD-PRESSURE READING, WITHOUT DIAGNOSIS OF HYPERTENSION: ICD-10-CM

## 2021-05-27 DIAGNOSIS — F41.9 ANXIETY DISORDER, UNSPECIFIED: ICD-10-CM

## 2021-05-27 DIAGNOSIS — K21.9 GASTRO-ESOPHAGEAL REFLUX DISEASE WITHOUT ESOPHAGITIS: ICD-10-CM

## 2021-05-27 DIAGNOSIS — R10.9 UNSPECIFIED ABDOMINAL PAIN: ICD-10-CM

## 2021-05-27 DIAGNOSIS — E78.5 HYPERLIPIDEMIA, UNSPECIFIED: ICD-10-CM

## 2021-05-27 LAB
ALBUMIN SERPL ELPH-MCNC: 3.4 G/DL — SIGNIFICANT CHANGE UP (ref 3.3–5)
ALP SERPL-CCNC: 77 U/L — SIGNIFICANT CHANGE UP (ref 40–120)
ALT FLD-CCNC: 28 U/L — SIGNIFICANT CHANGE UP (ref 10–45)
ANION GAP SERPL CALC-SCNC: 9 MMOL/L — SIGNIFICANT CHANGE UP (ref 5–17)
AST SERPL-CCNC: 35 U/L — SIGNIFICANT CHANGE UP (ref 10–40)
BILIRUB SERPL-MCNC: 0.7 MG/DL — SIGNIFICANT CHANGE UP (ref 0.2–1.2)
BUN SERPL-MCNC: 11 MG/DL — SIGNIFICANT CHANGE UP (ref 7–23)
CALCIUM SERPL-MCNC: 8.3 MG/DL — LOW (ref 8.4–10.5)
CHLORIDE SERPL-SCNC: 108 MMOL/L — SIGNIFICANT CHANGE UP (ref 96–108)
CO2 SERPL-SCNC: 25 MMOL/L — SIGNIFICANT CHANGE UP (ref 22–31)
COVID-19 SPIKE DOMAIN AB INTERP: POSITIVE
COVID-19 SPIKE DOMAIN ANTIBODY RESULT: >250 U/ML — HIGH
CREAT SERPL-MCNC: 1.02 MG/DL — SIGNIFICANT CHANGE UP (ref 0.5–1.3)
GLUCOSE SERPL-MCNC: 91 MG/DL — SIGNIFICANT CHANGE UP (ref 70–99)
HCT VFR BLD CALC: 40.6 % — SIGNIFICANT CHANGE UP (ref 39–50)
HGB BLD-MCNC: 13.3 G/DL — SIGNIFICANT CHANGE UP (ref 13–17)
MAGNESIUM SERPL-MCNC: 2.1 MG/DL — SIGNIFICANT CHANGE UP (ref 1.6–2.6)
MCHC RBC-ENTMCNC: 25 PG — LOW (ref 27–34)
MCHC RBC-ENTMCNC: 32.8 GM/DL — SIGNIFICANT CHANGE UP (ref 32–36)
MCV RBC AUTO: 76.5 FL — LOW (ref 80–100)
NRBC # BLD: 0 /100 WBCS — SIGNIFICANT CHANGE UP (ref 0–0)
PHOSPHATE SERPL-MCNC: 2.5 MG/DL — SIGNIFICANT CHANGE UP (ref 2.5–4.5)
PLATELET # BLD AUTO: 157 K/UL — SIGNIFICANT CHANGE UP (ref 150–400)
POTASSIUM SERPL-MCNC: 3.7 MMOL/L — SIGNIFICANT CHANGE UP (ref 3.5–5.3)
POTASSIUM SERPL-SCNC: 3.7 MMOL/L — SIGNIFICANT CHANGE UP (ref 3.5–5.3)
PROT SERPL-MCNC: 7.1 G/DL — SIGNIFICANT CHANGE UP (ref 6–8.3)
RBC # BLD: 5.31 M/UL — SIGNIFICANT CHANGE UP (ref 4.2–5.8)
RBC # FLD: 15.2 % — HIGH (ref 10.3–14.5)
SARS-COV-2 IGG+IGM SERPL QL IA: >250 U/ML — HIGH
SARS-COV-2 IGG+IGM SERPL QL IA: POSITIVE
SODIUM SERPL-SCNC: 142 MMOL/L — SIGNIFICANT CHANGE UP (ref 135–145)
WBC # BLD: 4.45 K/UL — SIGNIFICANT CHANGE UP (ref 3.8–10.5)
WBC # FLD AUTO: 4.45 K/UL — SIGNIFICANT CHANGE UP (ref 3.8–10.5)

## 2021-05-27 PROCEDURE — 99222 1ST HOSP IP/OBS MODERATE 55: CPT

## 2021-05-27 RX ORDER — POTASSIUM CHLORIDE 20 MEQ
10 PACKET (EA) ORAL
Refills: 0 | Status: COMPLETED | OUTPATIENT
Start: 2021-05-27 | End: 2021-05-27

## 2021-05-27 RX ORDER — OXYCODONE HYDROCHLORIDE 5 MG/1
5 TABLET ORAL ONCE
Refills: 0 | Status: DISCONTINUED | OUTPATIENT
Start: 2021-05-27 | End: 2021-05-27

## 2021-05-27 RX ADMIN — HEPARIN SODIUM 7500 UNIT(S): 5000 INJECTION INTRAVENOUS; SUBCUTANEOUS at 22:01

## 2021-05-27 RX ADMIN — OXYCODONE HYDROCHLORIDE 5 MILLIGRAM(S): 5 TABLET ORAL at 15:10

## 2021-05-27 RX ADMIN — OXYCODONE HYDROCHLORIDE 5 MILLIGRAM(S): 5 TABLET ORAL at 14:33

## 2021-05-27 RX ADMIN — Medication 100 MILLIEQUIVALENT(S): at 13:21

## 2021-05-27 RX ADMIN — ATORVASTATIN CALCIUM 20 MILLIGRAM(S): 80 TABLET, FILM COATED ORAL at 22:02

## 2021-05-27 RX ADMIN — HEPARIN SODIUM 7500 UNIT(S): 5000 INJECTION INTRAVENOUS; SUBCUTANEOUS at 06:33

## 2021-05-27 RX ADMIN — Medication 650 MILLIGRAM(S): at 14:33

## 2021-05-27 RX ADMIN — PANTOPRAZOLE SODIUM 40 MILLIGRAM(S): 20 TABLET, DELAYED RELEASE ORAL at 11:35

## 2021-05-27 RX ADMIN — Medication 650 MILLIGRAM(S): at 15:10

## 2021-05-27 RX ADMIN — HEPARIN SODIUM 7500 UNIT(S): 5000 INJECTION INTRAVENOUS; SUBCUTANEOUS at 14:34

## 2021-05-27 RX ADMIN — Medication 100 MILLIEQUIVALENT(S): at 11:35

## 2021-05-27 RX ADMIN — Medication 100 MILLIEQUIVALENT(S): at 14:22

## 2021-05-27 NOTE — PROGRESS NOTE ADULT - SUBJECTIVE AND OBJECTIVE BOX
HX: epigastric/RUQ pain    SUBJECTIVE: Patient seen and examined bedside by chief resident.  -N/-V, on NPO diet   +F/-BM   +OOBA   mild abdominal pain      heparin   Injectable 7500 Unit(s) SubCutaneous every 8 hours    MEDICATIONS  (PRN):  acetaminophen   Tablet .. 650 milliGRAM(s) Oral every 6 hours PRN Mild Pain (1 - 3)  LORazepam   Injectable 0.5 milliGRAM(s) IV Push every 6 hours PRN Anxiety  ondansetron Injectable 4 milliGRAM(s) IV Push every 6 hours PRN Nausea      I&O's Detail    26 May 2021 07:01  -  27 May 2021 07:00  --------------------------------------------------------  IN:    Lactated Ringers: 2112 mL  Total IN: 2112 mL    OUT:    Voided (mL): 600 mL  Total OUT: 600 mL    Total NET: 1512 mL      Vital Signs Last 24 Hrs  T(C): 37.4 (27 May 2021 09:19), Max: 37.4 (27 May 2021 09:19)  T(F): 99.4 (27 May 2021 09:19), Max: 99.4 (27 May 2021 09:19)  HR: 90 (27 May 2021 09:19) (85 - 100)  BP: 151/89 (27 May 2021 09:19) (106/69 - 154/85)  RR: 16 (27 May 2021 09:19) (16 - 18)  SpO2: 98% (27 May 2021 09:19) (96% - 99%)    General: NAD, resting comfortably in bed  Pulm: Nonlabored breathing, no respiratory distress on RA   Abd: soft, ND, TTP epigastric and RUQ mild, no rebound or guarding  Extrem: WWP, no edema    LABS:                        13.3   4.45  )-----------( 157      ( 27 May 2021 08:02 )             40.6     05-    142  |  108  |  11  ----------------------------<  91  3.7   |  25  |  1.02    Ca    8.3<L>      27 May 2021 08:02  Phos  2.5       Mg     2.1         TPro  7.1  /  Alb  3.4  /  TBili  0.7  /  DBili  x   /  AST  35  /  ALT  28  /  AlkPhos  77        Urinalysis Basic - ( 26 May 2021 12:33 )    Color: Yellow / Appearance: Clear / S.025 / pH: x  Gluc: x / Ketone: 15 mg/dL  / Bili: Negative / Urobili: 0.2 E.U./dL   Blood: x / Protein: Trace mg/dL / Nitrite: NEGATIVE   Leuk Esterase: NEGATIVE / RBC: < 5 /HPF / WBC < 5 /HPF   Sq Epi: x / Non Sq Epi: 0-5 /HPF / Bacteria: Present /HPF        RADIOLOGY & ADDITIONAL STUDIES:  MRI pending       A/P:  56 y/o male with PMH s/f Lymphoma in remission(s/p chemo), GERD, HLD who presented to the ED with c/o RUQ pain of 4 days duration a/w N/V. RUQ US, no cholelithiases or signs of cholecystitis, CBD dilation to 9 mm. MRI pending.     NPO/IVF  Pain/nausea control   PPI   Ativan for anxiety  OOBA/IS/SCDs/SQH   AM Labs  MR Pancreatic Protocol pending   GI consult    Plan discussed with attending and chief resident.   ____________________________________________________  Samantha Mcintosh MD     PGY1 - Surgery

## 2021-05-27 NOTE — CONSULT NOTE ADULT - ASSESSMENT
56 y/o male with PMH s/f Lymphoma in remission(s/p chemo), GERD, HLD who presented to the ED with c/o RUQ pain of 4 days duration. RUQ US, no cholelithiases or signs of cholecystitis, CBD dilation to 9 mm. Medicine consulted for comanagement

## 2021-05-27 NOTE — CONSULT NOTE ADULT - PROBLEM SELECTOR RECOMMENDATION 9
Plan per primary team   US with dilated CBD, however no cholelithiasis, lipase negative   For MR pancreatic protocol   Serial abdominal exam   Pain control   OOB, IS

## 2021-05-27 NOTE — CONSULT NOTE ADULT - PROBLEM SELECTOR RECOMMENDATION 4
Patient endorses increased anxiety especially with MRI   May need premedication   Rec IV ativan prior to MR

## 2021-05-27 NOTE — CONSULT NOTE ADULT - SUBJECTIVE AND OBJECTIVE BOX
GASTROENTEROLOGY CONSULT NOTE  HPI:  56 y/o male with PMH s/f Lmphoma in remission(s/p chemo), GERD, HLD presenting to ED with c/o RUQ pain of 4 days duration. Sharp pain to RUQ with nausea and vomiting. Multiple NBNB emesis. + fever first two days. no chills. no diarrhea or constipation. no bloody stool or melena. no urinary sx's. pain radiates to back. no cp or sob. no coughing. no further complaints. Patient had similar episodes in the past 6 and 12 months ago and RUQ usg both times was WNL. RUQ today s/f only CBD dilation to 9 mm. Last colonoscopy 2 years ago, 2 polyps, all normal.    In the ED, 36.8, 85, 117/82, 18/96% RA. All labs WNL. 2l Bolus (26 May 2021 13:53)    GI consulted for RUQ/epigastric pain. Patient seen and examined at bedside.     Allergies    iodine (Anaphylaxis)  IV Contrast (Anaphylaxis)  shellfish (Anaphylaxis)    Intolerances      Home Medications:  atorvastatin 20 mg oral tablet: 1 tab(s) orally once a day (26 May 2021 14:23)  omeprazole 20 mg oral delayed release tablet: 1 tab(s) orally once a day (26 May 2021 14:23)    MEDICATIONS:  MEDICATIONS  (STANDING):  atorvastatin 20 milliGRAM(s) Oral at bedtime  heparin   Injectable 7500 Unit(s) SubCutaneous every 8 hours  lactated ringers. 1000 milliLiter(s) (176 mL/Hr) IV Continuous <Continuous>  pantoprazole  Injectable 40 milliGRAM(s) IV Push daily    MEDICATIONS  (PRN):  acetaminophen   Tablet .. 650 milliGRAM(s) Oral every 6 hours PRN Mild Pain (1 - 3)  LORazepam   Injectable 0.5 milliGRAM(s) IV Push every 6 hours PRN Anxiety  ondansetron Injectable 4 milliGRAM(s) IV Push every 6 hours PRN Nausea    PAST MEDICAL & SURGICAL HISTORY:  HTN (hypertension)    Lymphoma  2015    No significant past surgical history      FAMILY HISTORY:    SOCIAL HISTORY:  Tobacco: [ ] Current, [ ] Former, [ ] Never; Pack Years:  Alcohol:  Illicit Drugs:    REVIEW OF SYSTEMS:  CONSTITUTIONAL: No weakness, fevers or chills  HEENT: No visual changes; No vertigo or throat pain   NECK: No pain or stiffness  RESPIRATORY: No cough, wheezing, hemoptysis; No shortness of breath  CARDIOVASCULAR: No chest pain or palpitations  GASTROINTESTINAL: As above.  GENITOURINARY: No dysuria, frequency or hematuria  NEUROLOGICAL: No numbness or weakness  SKIN: No itching, burning, rashes, or lesions   All other 10 review of systems is negative unless indicated above.    Vital Signs Last 24 Hrs  T(C): 37.2 (27 May 2021 15:24), Max: 37.4 (27 May 2021 09:19)  T(F): 99 (27 May 2021 15:24), Max: 99.4 (27 May 2021 09:19)  HR: 88 (27 May 2021 15:24) (85 - 100)  BP: 142/85 (27 May 2021 15:24) (106/69 - 154/85)  BP(mean): --  RR: 18 (27 May 2021 15:24) (16 - 18)  SpO2: 97% (27 May 2021 15:24) (96% - 99%)    05-26 @ 07:01 - 05-27 @ 07:00  --------------------------------------------------------  IN: 2112 mL / OUT: 600 mL / NET: 1512 mL    05-27 @ 07:01 - 05-27 @ 15:34  --------------------------------------------------------  IN: 0 mL / OUT: 300 mL / NET: -300 mL        PHYSICAL EXAM:    General: Well developed; well nourished; in no acute distress  Eyes: Anicteric sclerae, moist conjunctivae  HENT: Moist mucous membranes  Neck: Trachea midline, supple  Lungs: Normal respiratory effort, no intercostal retractions  Cardiovascular: RRR  Abdomen: Soft, mildly tender in RUQ non-distended; No rebound or guarding  Extremities: Normal range of motion, No clubbing, cyanosis or edema  Neurological: Alert and oriented x3  Skin: Warm and dry. No obvious rash    LABS:                        13.3   4.45  )-----------( 157      ( 27 May 2021 08:02 )             40.6     05-27    142  |  108  |  11  ----------------------------<  91  3.7   |  25  |  1.02    Ca    8.3<L>      27 May 2021 08:02  Phos  2.5     05-27  Mg     2.1     05-27    TPro  7.1  /  Alb  3.4  /  TBili  0.7  /  DBili  x   /  AST  35  /  ALT  28  /  AlkPhos  77  05-27            RADIOLOGY & ADDITIONAL STUDIES:     Reviewed
Patient is a 55y old  Male who presents with a chief complaint of RUQ pain (27 May 2021 11:52)    HPI:  56 y/o male with PMH s/f Lmphoma in remission(s/p chemo), GERD, HLD presenting to ED with c/o RUQ pain of 4 days duration. Sharp pain to RUQ with nausea and vomiting. Multiple NBNB emesis. + fever first two days. no chills. no diarrhea or constipation. no bloody stool or melena. no urinary sx's. pain radiates to back. no cp or sob. no coughing. no further complaints. Patient had similar episodes in the past 6 and 12 months ago and RUQ usg both times was WNL. RUQ today s/f only CBD dilation to 9 mm. Last colonoscopy 2 years ago, 2 polyps, all normal.    In the ED, 36.8, 85, 117/82, 18/96% RA. All labs WNL. 2l Bolus (26 May 2021 13:53)      INTERVAL HPI/OVERNIGHT EVENTS:  Patient was seen and examined at bedside. As per nurse and patient, no o/n events, patient resting comfortably. Endorses mild abdominal pain, however improved from admission. Denies any nausea or vomiting, has not had food yet. Endorses +flatus, denies BM. Will continue to go OOB. Patient denies: fever, chills, dizziness, weakness, HA, Changes in vision, CP, palpitations, SOB, cough, dysuria, changes in bowel movements, LE edema.      PAST MEDICAL & SURGICAL HISTORY:  HTN (hypertension)    Lymphoma  2015    No significant past surgical history        SOCIAL HISTORY  Alcohol: denies  Tobacco: denies  Illicit substance use: denies      FAMILY HISTORY:    REVIEW OF SYSTEMS:  CONSTITUTIONAL: No fever, weight loss, or fatigue  EYES: No eye pain, visual disturbances, or discharge  ENMT:  No difficulty hearing, tinnitus, vertigo; No sinus or throat pain  NECK: No pain or stiffness  RESPIRATORY: No cough, wheezing, chills or hemoptysis; No shortness of breath  CARDIOVASCULAR: No chest pain, palpitations, dizziness, or leg swelling  GASTROINTESTINAL: as above  GENITOURINARY: No dysuria, frequency, hematuria, or incontinence  NEUROLOGICAL: No headaches, memory loss, loss of strength, numbness, or tremors  SKIN: No itching, burning, rashes, or lesions   LYMPH NODES: No enlarged glands  ENDOCRINE: No heat or cold intolerance; No hair loss  MUSCULOSKELETAL: No joint pain or swelling; No muscle, back, or extremity pain  PSYCHIATRIC: No depression, anxiety, mood swings, or difficulty sleeping  HEME/LYMPH: No easy bruising, or bleeding gums  ALLERY AND IMMUNOLOGIC: No hives or eczema    T(C): 37.4 (21 @ 09:19), Max: 37.4 (21 @ 09:19)  HR: 90 (21 @ 09:19) (85 - 100)  BP: 151/89 (21 @ 09:19) (106/69 - 154/85)  RR: 16 (21 @ 09:19) (16 - 18)  SpO2: 98% (21 @ 09:19) (96% - 99%)  Wt(kg): --  I&O's Summary    26 May 2021 07:01  -  27 May 2021 07:00  --------------------------------------------------------  IN: 2112 mL / OUT: 600 mL / NET: 1512 mL        PHYSICAL EXAM:  GENERAL: NAD, laying comfortably in bed  HEAD:  Atraumatic, Normocephalic  EYES: EOMI, PERRLA, conjunctiva and sclera clear  ENMT: No tonsillar erythema, exudates, or enlargement; MMM  NECK: Supple, No JVD  NERVOUS SYSTEM:  Alert & Oriented X3, no focal deficits   CHEST/LUNG: Clear to percussion bilaterally; No rales, rhonchi, wheezing, or rubs  HEART: Regular rate and rhythm; No murmurs, rubs, or gallops  ABDOMEN: Soft, mild RUQ tenderness and epigastric tenderness on deep palpation, Nondistended  EXTREMITIES:  2+ Peripheral Pulses, No clubbing, cyanosis, or edema  LYMPH: No lymphadenopathy noted  SKIN: No rashes or lesions        LABS:                        13.3   4.45  )-----------( 157      ( 27 May 2021 08:02 )             40.6         142  |  108  |  11  ----------------------------<  91  3.7   |  25  |  1.02    Ca    8.3<L>      27 May 2021 08:02  Phos  2.5       Mg     2.1         TPro  7.1  /  Alb  3.4  /  TBili  0.7  /  DBili  x   /  AST  35  /  ALT  28  /  AlkPhos  77        Urinalysis Basic - ( 26 May 2021 12:33 )    Color: Yellow / Appearance: Clear / S.025 / pH: x  Gluc: x / Ketone: 15 mg/dL  / Bili: Negative / Urobili: 0.2 E.U./dL   Blood: x / Protein: Trace mg/dL / Nitrite: NEGATIVE   Leuk Esterase: NEGATIVE / RBC: < 5 /HPF / WBC < 5 /HPF   Sq Epi: x / Non Sq Epi: 0-5 /HPF / Bacteria: Present /HPF      CAPILLARY BLOOD GLUCOSE            Urinalysis Basic - ( 26 May 2021 12:33 )    Color: Yellow / Appearance: Clear / S.025 / pH: x  Gluc: x / Ketone: 15 mg/dL  / Bili: Negative / Urobili: 0.2 E.U./dL   Blood: x / Protein: Trace mg/dL / Nitrite: NEGATIVE   Leuk Esterase: NEGATIVE / RBC: < 5 /HPF / WBC < 5 /HPF   Sq Epi: x / Non Sq Epi: 0-5 /HPF / Bacteria: Present /HPF        MEDICATIONS  (STANDING):  atorvastatin 20 milliGRAM(s) Oral at bedtime  heparin   Injectable 7500 Unit(s) SubCutaneous every 8 hours  lactated ringers. 1000 milliLiter(s) (176 mL/Hr) IV Continuous <Continuous>  pantoprazole  Injectable 40 milliGRAM(s) IV Push daily    MEDICATIONS  (PRN):  acetaminophen   Tablet .. 650 milliGRAM(s) Oral every 6 hours PRN Mild Pain (1 - 3)  LORazepam   Injectable 0.5 milliGRAM(s) IV Push every 6 hours PRN Anxiety  ondansetron Injectable 4 milliGRAM(s) IV Push every 6 hours PRN Nausea      RADIOLOGY & ADDITIONAL TESTS:    Imaging Personally Reviewed:  [ ] YES  [ ] NO    Consultant(s) Notes Reviewed:  [ ] YES  [ ] NO    Care Discussed with Consultants/Other Providers [ ] YES  [ ] NO

## 2021-05-27 NOTE — CONSULT NOTE ADULT - ATTENDING COMMENTS
Pt seen and examined, case d/w fellow -- Dr. Botello.  Plan for MRI/MRCP to evaluate the biliary tree for etiology of pt's discomfort as well as an EGD.

## 2021-05-27 NOTE — PROGRESS NOTE ADULT - ATTENDING COMMENTS
Still has RUQ and epigastric pain. Ate last night despite NPO order  Interval history: had radiation in epigastrium for lymphoma  AFVSS  Abd soft, ND. TTP epigastrium across subcostal almost to flank    labs ok    A/P: RUQ pain, dilated CBD without gallstones.   1. Awaiting MRI  2. NPO, IVF

## 2021-05-27 NOTE — CONSULT NOTE ADULT - PROBLEM SELECTOR RECOMMENDATION 5
Likely component of increased anxiety   No need to treatment at this time   IF SBP>180 persistently, and anxiety treated with IV benzo, can start norvasc 5 mg QD

## 2021-05-27 NOTE — CONSULT NOTE ADULT - ASSESSMENT
54 y/o male with PMH s/f Lmphoma in remission(s/p chemo), GERD, HLD presenting to ED with c/o RUQ pain of 4 days duration. GI consulted for RUQ/epigastric pain.     #RUQ pain  - labs and imaging personally reviewed   - unclear etiology for patient's pain, but his CBD is dilated to 9 mm  - f/u MRI, pending results will consider endoscopic evaluation    Foster Botello MD  PGY-4, Gastroenterology Fellow  pager: [phone] 56 y/o male with PMH s/f Lmphoma in remission(s/p chemo), GERD, HLD presenting to ED with c/o RUQ pain of 4 days duration. GI consulted for RUQ/epigastric pain.     #RUQ pain  - labs and imaging personally reviewed   - unclear etiology for patient's pain, but his CBD is dilated to 9 mm  - f/u MRI, pending results will consider endoscopic evaluation    ADDENDUM: patient also endorsing some degree of dysphagia/odynophagia but points to his epigastrum, following MRI vs CT panc protocol, will most likely pursue EGD first to eval for stricture, hiatal hernia, esophagitis    Foster Botello MD  PGY-4, Gastroenterology Fellow  pager: [phone]

## 2021-05-28 ENCOUNTER — RESULT REVIEW (OUTPATIENT)
Age: 56
End: 2021-05-28

## 2021-05-28 DIAGNOSIS — Z01.818 ENCOUNTER FOR OTHER PREPROCEDURAL EXAMINATION: ICD-10-CM

## 2021-05-28 LAB
ALBUMIN SERPL ELPH-MCNC: 3.7 G/DL — SIGNIFICANT CHANGE UP (ref 3.3–5)
ALP SERPL-CCNC: 84 U/L — SIGNIFICANT CHANGE UP (ref 40–120)
ALT FLD-CCNC: 33 U/L — SIGNIFICANT CHANGE UP (ref 10–45)
ANION GAP SERPL CALC-SCNC: 12 MMOL/L — SIGNIFICANT CHANGE UP (ref 5–17)
AST SERPL-CCNC: 33 U/L — SIGNIFICANT CHANGE UP (ref 10–40)
BILIRUB SERPL-MCNC: 0.8 MG/DL — SIGNIFICANT CHANGE UP (ref 0.2–1.2)
BUN SERPL-MCNC: 10 MG/DL — SIGNIFICANT CHANGE UP (ref 7–23)
CALCIUM SERPL-MCNC: 8.7 MG/DL — SIGNIFICANT CHANGE UP (ref 8.4–10.5)
CHLORIDE SERPL-SCNC: 106 MMOL/L — SIGNIFICANT CHANGE UP (ref 96–108)
CO2 SERPL-SCNC: 25 MMOL/L — SIGNIFICANT CHANGE UP (ref 22–31)
CREAT SERPL-MCNC: 0.98 MG/DL — SIGNIFICANT CHANGE UP (ref 0.5–1.3)
GLUCOSE SERPL-MCNC: 88 MG/DL — SIGNIFICANT CHANGE UP (ref 70–99)
HCT VFR BLD CALC: 43.4 % — SIGNIFICANT CHANGE UP (ref 39–50)
HGB BLD-MCNC: 14.2 G/DL — SIGNIFICANT CHANGE UP (ref 13–17)
MAGNESIUM SERPL-MCNC: 2.1 MG/DL — SIGNIFICANT CHANGE UP (ref 1.6–2.6)
MCHC RBC-ENTMCNC: 25.4 PG — LOW (ref 27–34)
MCHC RBC-ENTMCNC: 32.7 GM/DL — SIGNIFICANT CHANGE UP (ref 32–36)
MCV RBC AUTO: 77.8 FL — LOW (ref 80–100)
NRBC # BLD: 0 /100 WBCS — SIGNIFICANT CHANGE UP (ref 0–0)
PHOSPHATE SERPL-MCNC: 3 MG/DL — SIGNIFICANT CHANGE UP (ref 2.5–4.5)
PLATELET # BLD AUTO: 182 K/UL — SIGNIFICANT CHANGE UP (ref 150–400)
POTASSIUM SERPL-MCNC: 4.3 MMOL/L — SIGNIFICANT CHANGE UP (ref 3.5–5.3)
POTASSIUM SERPL-SCNC: 4.3 MMOL/L — SIGNIFICANT CHANGE UP (ref 3.5–5.3)
PROT SERPL-MCNC: 7.5 G/DL — SIGNIFICANT CHANGE UP (ref 6–8.3)
RBC # BLD: 5.58 M/UL — SIGNIFICANT CHANGE UP (ref 4.2–5.8)
RBC # FLD: 14.9 % — HIGH (ref 10.3–14.5)
SODIUM SERPL-SCNC: 143 MMOL/L — SIGNIFICANT CHANGE UP (ref 135–145)
WBC # BLD: 4.88 K/UL — SIGNIFICANT CHANGE UP (ref 3.8–10.5)
WBC # FLD AUTO: 4.88 K/UL — SIGNIFICANT CHANGE UP (ref 3.8–10.5)

## 2021-05-28 PROCEDURE — 99233 SBSQ HOSP IP/OBS HIGH 50: CPT | Mod: GC

## 2021-05-28 PROCEDURE — 88305 TISSUE EXAM BY PATHOLOGIST: CPT | Mod: 26

## 2021-05-28 PROCEDURE — 74177 CT ABD & PELVIS W/CONTRAST: CPT | Mod: 26

## 2021-05-28 RX ORDER — SUCRALFATE 1 G
1 TABLET ORAL EVERY 6 HOURS
Refills: 0 | Status: DISCONTINUED | OUTPATIENT
Start: 2021-05-28 | End: 2021-06-02

## 2021-05-28 RX ORDER — SUCRALFATE 1 G
1 TABLET ORAL EVERY 6 HOURS
Refills: 0 | Status: DISCONTINUED | OUTPATIENT
Start: 2021-05-28 | End: 2021-05-28

## 2021-05-28 RX ORDER — DIPHENHYDRAMINE HCL 50 MG
50 CAPSULE ORAL ONCE
Refills: 0 | Status: COMPLETED | OUTPATIENT
Start: 2021-05-28 | End: 2021-05-28

## 2021-05-28 RX ORDER — SODIUM CHLORIDE 9 MG/ML
1000 INJECTION, SOLUTION INTRAVENOUS ONCE
Refills: 0 | Status: COMPLETED | OUTPATIENT
Start: 2021-05-28 | End: 2021-05-28

## 2021-05-28 RX ORDER — METOCLOPRAMIDE HCL 10 MG
10 TABLET ORAL ONCE
Refills: 0 | Status: COMPLETED | OUTPATIENT
Start: 2021-05-28 | End: 2021-05-28

## 2021-05-28 RX ORDER — PANTOPRAZOLE SODIUM 20 MG/1
40 TABLET, DELAYED RELEASE ORAL
Refills: 0 | Status: DISCONTINUED | OUTPATIENT
Start: 2021-05-28 | End: 2021-06-01

## 2021-05-28 RX ORDER — HYDROMORPHONE HYDROCHLORIDE 2 MG/ML
0.5 INJECTION INTRAMUSCULAR; INTRAVENOUS; SUBCUTANEOUS ONCE
Refills: 0 | Status: DISCONTINUED | OUTPATIENT
Start: 2021-05-28 | End: 2021-05-28

## 2021-05-28 RX ADMIN — PANTOPRAZOLE SODIUM 40 MILLIGRAM(S): 20 TABLET, DELAYED RELEASE ORAL at 14:20

## 2021-05-28 RX ADMIN — HEPARIN SODIUM 7500 UNIT(S): 5000 INJECTION INTRAVENOUS; SUBCUTANEOUS at 21:20

## 2021-05-28 RX ADMIN — Medication 50 MILLIGRAM(S): at 14:33

## 2021-05-28 RX ADMIN — Medication 650 MILLIGRAM(S): at 20:32

## 2021-05-28 RX ADMIN — Medication 1 GRAM(S): at 22:49

## 2021-05-28 RX ADMIN — SODIUM CHLORIDE 1000 MILLILITER(S): 9 INJECTION, SOLUTION INTRAVENOUS at 10:04

## 2021-05-28 RX ADMIN — Medication 60 MILLIGRAM(S): at 06:58

## 2021-05-28 RX ADMIN — Medication 60 MILLIGRAM(S): at 10:05

## 2021-05-28 RX ADMIN — HEPARIN SODIUM 7500 UNIT(S): 5000 INJECTION INTRAVENOUS; SUBCUTANEOUS at 14:23

## 2021-05-28 RX ADMIN — PANTOPRAZOLE SODIUM 40 MILLIGRAM(S): 20 TABLET, DELAYED RELEASE ORAL at 22:49

## 2021-05-28 RX ADMIN — Medication 50 MILLIGRAM(S): at 10:57

## 2021-05-28 RX ADMIN — Medication 10 MILLIGRAM(S): at 22:49

## 2021-05-28 RX ADMIN — HEPARIN SODIUM 7500 UNIT(S): 5000 INJECTION INTRAVENOUS; SUBCUTANEOUS at 06:57

## 2021-05-28 RX ADMIN — ONDANSETRON 4 MILLIGRAM(S): 8 TABLET, FILM COATED ORAL at 14:33

## 2021-05-28 RX ADMIN — SODIUM CHLORIDE 176 MILLILITER(S): 9 INJECTION, SOLUTION INTRAVENOUS at 22:49

## 2021-05-28 NOTE — DIETITIAN INITIAL EVALUATION ADULT. - DIET TYPE
Recommend bedside dys screen prior to PO diet; if pt passes adv diet as tolerated starting with clears, adv to DASH TLC; if pt fails bedside dys screen, suggest NPO/SLP

## 2021-05-28 NOTE — PROGRESS NOTE ADULT - SUBJECTIVE AND OBJECTIVE BOX
Patient is a 55y old  Male who presents with a chief complaint of RUQ pain (28 May 2021 07:58)      INTERVAL HPI/OVERNIGHT EVENTS:  Patient was seen and examined at bedside. As per nurse and patient, no o/n events, patient resting comfortably. Still with epigastric and RUQ tenderness. Has been OOB, anxious that we do not have an answer. Hoping the CT will show what is going on. Endorses intermittent pain that is exacerbated by swallowing. Patient denies: fever, chills, dizziness, weakness, HA, Changes in vision, CP, palpitations, SOB, cough, N/V/D/C, dysuria, changes in bowel movements, LE edema.      PAST MEDICAL & SURGICAL HISTORY:  HTN (hypertension)    Lymphoma  2015    No significant past surgical history        REVIEW OF SYSTEMS:  CONSTITUTIONAL: No fever, weight loss, or fatigue  EYES: No eye pain, visual disturbances, or discharge  ENMT:  No difficulty hearing, tinnitus, vertigo; No sinus or throat pain  NECK: No pain or stiffness  RESPIRATORY: No cough, wheezing, chills or hemoptysis; No shortness of breath  CARDIOVASCULAR: No chest pain, palpitations, dizziness, or leg swelling  GASTROINTESTINAL: as above  GENITOURINARY: No dysuria, frequency, hematuria, or incontinence  NEUROLOGICAL: No headaches, memory loss, loss of strength, numbness, or tremors  SKIN: No itching, burning, rashes, or lesions   LYMPH NODES: No enlarged glands  ENDOCRINE: No heat or cold intolerance; No hair loss  MUSCULOSKELETAL: No joint pain or swelling; No muscle, back, or extremity pain  PSYCHIATRIC: No depression, anxiety, mood swings, or difficulty sleeping  HEME/LYMPH: No easy bruising, or bleeding gums  ALLERY AND IMMUNOLOGIC: No hives or eczema    T(C): 37.1 (21 @ 05:17), Max: 37.2 (21 @ 15:24)  HR: 80 (21 @ 05:17) (80 - 88)  BP: 134/80 (21 @ 05:17) (134/80 - 144/76)  RR: 16 (21 @ 05:17) (16 - 18)  SpO2: 98% (21 @ 05:17) (97% - 98%)  Wt(kg): --  I&O's Summary    27 May 2021 07:01  -  28 May 2021 07:00  --------------------------------------------------------  IN: 1936 mL / OUT: 1800 mL / NET: 136 mL        PHYSICAL EXAM:  GENERAL: NAD, laying comfortably in bed  HEAD:  Atraumatic, Normocephalic  EYES: EOMI, PERRLA, conjunctiva and sclera clear  ENMT: No tonsillar erythema, exudates, or enlargement; MMM  NECK: Supple, No JVD  NERVOUS SYSTEM:  Alert & Oriented X3, no focal deficits   CHEST/LUNG: Clear to percussion bilaterally; No rales, rhonchi, wheezing, or rubs  HEART: Regular rate and rhythm; No murmurs, rubs, or gallops  ABDOMEN: Soft, mild epigastric and RUQ tenderness on deep palpation, Nondistended; Bowel sounds present  EXTREMITIES:  2+ Peripheral Pulses, No clubbing, cyanosis, or edema  LYMPH: No lymphadenopathy noted  SKIN: No rashes or lesions        LABS:                        14.2   4.88  )-----------( 182      ( 28 May 2021 08:19 )             43.4         143  |  106  |  10  ----------------------------<  88  4.3   |  25  |  0.98    Ca    8.7      28 May 2021 08:19  Phos  3.0       Mg     2.1         TPro  7.5  /  Alb  3.7  /  TBili  0.8  /  DBili  x   /  AST  33  /  ALT  33  /  AlkPhos  84        Urinalysis Basic - ( 26 May 2021 12:33 )    Color: Yellow / Appearance: Clear / S.025 / pH: x  Gluc: x / Ketone: 15 mg/dL  / Bili: Negative / Urobili: 0.2 E.U./dL   Blood: x / Protein: Trace mg/dL / Nitrite: NEGATIVE   Leuk Esterase: NEGATIVE / RBC: < 5 /HPF / WBC < 5 /HPF   Sq Epi: x / Non Sq Epi: 0-5 /HPF / Bacteria: Present /HPF      CAPILLARY BLOOD GLUCOSE            Urinalysis Basic - ( 26 May 2021 12:33 )    Color: Yellow / Appearance: Clear / S.025 / pH: x  Gluc: x / Ketone: 15 mg/dL  / Bili: Negative / Urobili: 0.2 E.U./dL   Blood: x / Protein: Trace mg/dL / Nitrite: NEGATIVE   Leuk Esterase: NEGATIVE / RBC: < 5 /HPF / WBC < 5 /HPF   Sq Epi: x / Non Sq Epi: 0-5 /HPF / Bacteria: Present /HPF        MEDICATIONS  (STANDING):  atorvastatin 20 milliGRAM(s) Oral at bedtime  heparin   Injectable 7500 Unit(s) SubCutaneous every 8 hours  lactated ringers. 1000 milliLiter(s) (176 mL/Hr) IV Continuous <Continuous>  pantoprazole  Injectable 40 milliGRAM(s) IV Push daily    MEDICATIONS  (PRN):  acetaminophen   Tablet .. 650 milliGRAM(s) Oral every 6 hours PRN Mild Pain (1 - 3)  LORazepam   Injectable 0.5 milliGRAM(s) IV Push every 6 hours PRN Anxiety  ondansetron Injectable 4 milliGRAM(s) IV Push every 6 hours PRN Nausea      RADIOLOGY & ADDITIONAL TESTS:    Imaging Personally Reviewed:  [ ] YES  [ ] NO    Consultant(s) Notes Reviewed:  [ ] YES  [ ] NO    Care Discussed with Consultants/Other Providers [ ] YES  [ ] NO

## 2021-05-28 NOTE — CHART NOTE - NSCHARTNOTEFT_GEN_A_CORE
Patient is s/p EGD w/ Dr. Urrutia in the [facility] w/ the following findings and recommendations.     Impressions:  1. Severe LA grade D distal esophagitis  2. Two cm Hill Grade II hiatal hernia  3. Mild gastritis s/p biopsies                 Recommendations:  1. Follow up pathology  2. PPI BID   3. Carafate suspension 1g QID  4. Clear liquid diet  5. Repeat EGD in 6 weeks to ensure healing    Foster Botello MD  PGY-4, Gastroenterology Fellow  pager: [phone]

## 2021-05-28 NOTE — PROGRESS NOTE ADULT - SUBJECTIVE AND OBJECTIVE BOX
24 hr events:  O/N: -N/-V, +F/-BM, ambulates, MRI is not done, ordered for Solu-Medrol @ 6 a.m.  : MRI pending, GI consult called, +abd. pain, -N/-V      SUBJECTIVE:  Pt seen and examined by chief resident. Pt is doing well, resting comfortably on bed. Reporting some pain overnight. No nausea or vomiting.     Vital Signs Last 24 Hrs  T(C): 37.1 (28 May 2021 05:17), Max: 37.4 (27 May 2021 09:19)  T(F): 98.8 (28 May 2021 05:17), Max: 99.4 (27 May 2021 09:19)  HR: 80 (28 May 2021 05:17) (80 - 90)  BP: 134/80 (28 May 2021 05:17) (134/80 - 151/89)  RR: 16 (28 May 2021 05:17) (16 - 18)  SpO2: 98% (28 May 2021 05:17) (97% - 98%)    Physical Exam:  General: NAD  Pulmonary: Nonlabored breathing, no respiratory distress  Abdominal: soft, ND, tender in the RUQ and epigastrium  Extremities: WWP, SCDs in place    I&O's Summary    27 May 2021 07:01  -  28 May 2021 07:00  --------------------------------------------------------  IN: 1936 mL / OUT: 1800 mL / NET: 136 mL        LABS:                        13.3   4.45  )-----------( 157      ( 27 May 2021 08:02 )             40.6     05-    142  |  108  |  11  ----------------------------<  91  3.7   |  25  |  1.02    Ca    8.3<L>      27 May 2021 08:02  Phos  2.5     05-  Mg     2.1         TPro  7.1  /  Alb  3.4  /  TBili  0.7  /  DBili  x   /  AST  35  /  ALT  28  /  AlkPhos  77  05-27      Urinalysis Basic - ( 26 May 2021 12:33 )  Color: Yellow / Appearance: Clear / S.025 / pH: x  Gluc: x / Ketone: 15 mg/dL  / Bili: Negative / Urobili: 0.2 E.U./dL   Blood: x / Protein: Trace mg/dL / Nitrite: NEGATIVE   Leuk Esterase: NEGATIVE / RBC: < 5 /HPF / WBC < 5 /HPF   Sq Epi: x / Non Sq Epi: 0-5 /HPF / Bacteria: Present /HPF      CAPILLARY BLOOD GLUCOSE        LIVER FUNCTIONS - ( 27 May 2021 08:02 )  Alb: 3.4 g/dL / Pro: 7.1 g/dL / ALK PHOS: 77 U/L / ALT: 28 U/L / AST: 35 U/L / GGT: x             RADIOLOGY & ADDITIONAL STUDIES:

## 2021-05-28 NOTE — PROGRESS NOTE ADULT - ATTENDING COMMENTS
Pain unchanged.  Still awaiting MRI.  AFVSS  Abd soft, less tender in RUQ and epigastrium    labs remain normal.    A/P: RUQ pain and CBD dilation of unclear etiology  in absence of gallstones. History of radiation for lymphoma.  1. CTAP pancreatic protocol.  Will need premedication for this.  2. EGD per GI  3. NPO, IVF  4. OOB, IS    Dr. Leon assuming care.

## 2021-05-28 NOTE — PROGRESS NOTE ADULT - PROBLEM SELECTOR PLAN 1
Plan per primary team   US with dilated CBD, however no cholelithiasis, lipase negative   Could not tolerate MR, awaiting CT for eval  Serial abdominal exam   Pain control   OOB, IS

## 2021-05-28 NOTE — DIETITIAN INITIAL EVALUATION ADULT. - OTHER INFO
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 Sharp pain to RUQ with nausea and vomiting, no diarrhea or constipation, no bloody stool or melena. RUQ  s/f only CBD dilation to 9 mm. Pt Admit team 4 regional. US with dilated CBD, however no cholelithiasis, lipase negative. Could not tolerate MR, awaiting CT for eval of which pt remains NPO for. Pt reports +hunger however feels if he was to eat would be unable to tolerate. Despite GI distress PTA pt remained with good PO intake. Does report some ABD pain, otherwise denies GI distress at this time which he feels is 2/2 to NPO. Pt reports he would like to have more stable diet upon GI distress resolving to promote wt loss; wt hx details not provided. Noted per progress notes endorsing some degree of dysphagia/odynophagia but points to his epigastrium. Allergic to shellfish. No pain. Giovanni 20. No edema/pressure ulcers.  Please see below for nutritions recommendations. Paged Team.

## 2021-05-28 NOTE — DIETITIAN INITIAL EVALUATION ADULT. - ADD RECOMMEND
Monitor Skin, Labs, Wts. GI/pain per team. RD to remain available for additional nutrition interventions as needed.

## 2021-05-28 NOTE — PROGRESS NOTE ADULT - ASSESSMENT
54 y/o male with PMH s/f Lymphoma in remission(s/p chemo through epigastrium), GERD, HLD presenting to ED with c/o RUQ pain of 4 days duration a/w N/V. RUQ USG today s/f only CBD dilation to 9 mm. CT pending.     NPO/IVF  Pain/nausea control   PPI   Ativan for anxiety  OOBA/IS/SCDs/SQH   AM Labs  GI consult  CT this AM

## 2021-05-28 NOTE — DIETITIAN INITIAL EVALUATION ADULT. - OTHER CALCULATIONS
IBW used to calculate energy needs due to pt's current body weight exceeding 120% of IBW; adjusted for age, BMI

## 2021-05-28 NOTE — PROGRESS NOTE ADULT - ASSESSMENT
54 y/o male with PMH s/f Lymphoma in remission(s/p chemo), GERD, HLD who presented to the ED with c/o RUQ pain of 4 days duration. RUQ US, no cholelithiases or signs of cholecystitis, CBD dilation to 9 mm. Medicine consulted for comanagement

## 2021-05-29 LAB
ALBUMIN SERPL ELPH-MCNC: 2.8 G/DL — LOW (ref 3.3–5)
ALP SERPL-CCNC: 64 U/L — SIGNIFICANT CHANGE UP (ref 40–120)
ALT FLD-CCNC: 29 U/L — SIGNIFICANT CHANGE UP (ref 10–45)
ANION GAP SERPL CALC-SCNC: 14 MMOL/L — SIGNIFICANT CHANGE UP (ref 5–17)
AST SERPL-CCNC: 30 U/L — SIGNIFICANT CHANGE UP (ref 10–40)
BILIRUB SERPL-MCNC: 0.4 MG/DL — SIGNIFICANT CHANGE UP (ref 0.2–1.2)
BUN SERPL-MCNC: 13 MG/DL — SIGNIFICANT CHANGE UP (ref 7–23)
CALCIUM SERPL-MCNC: 8 MG/DL — LOW (ref 8.4–10.5)
CHLORIDE SERPL-SCNC: 107 MMOL/L — SIGNIFICANT CHANGE UP (ref 96–108)
CO2 SERPL-SCNC: 19 MMOL/L — LOW (ref 22–31)
CREAT SERPL-MCNC: 0.91 MG/DL — SIGNIFICANT CHANGE UP (ref 0.5–1.3)
GLUCOSE SERPL-MCNC: 93 MG/DL — SIGNIFICANT CHANGE UP (ref 70–99)
HCT VFR BLD CALC: 37.9 % — LOW (ref 39–50)
HGB BLD-MCNC: 12.5 G/DL — LOW (ref 13–17)
MAGNESIUM SERPL-MCNC: 1.6 MG/DL — SIGNIFICANT CHANGE UP (ref 1.6–2.6)
MCHC RBC-ENTMCNC: 25.4 PG — LOW (ref 27–34)
MCHC RBC-ENTMCNC: 33 GM/DL — SIGNIFICANT CHANGE UP (ref 32–36)
MCV RBC AUTO: 77 FL — LOW (ref 80–100)
NRBC # BLD: 0 /100 WBCS — SIGNIFICANT CHANGE UP (ref 0–0)
PHOSPHATE SERPL-MCNC: 2.4 MG/DL — LOW (ref 2.5–4.5)
PLATELET # BLD AUTO: 167 K/UL — SIGNIFICANT CHANGE UP (ref 150–400)
POTASSIUM SERPL-MCNC: 3.8 MMOL/L — SIGNIFICANT CHANGE UP (ref 3.5–5.3)
POTASSIUM SERPL-SCNC: 3.8 MMOL/L — SIGNIFICANT CHANGE UP (ref 3.5–5.3)
PROT SERPL-MCNC: 5.4 G/DL — LOW (ref 6–8.3)
RBC # BLD: 4.92 M/UL — SIGNIFICANT CHANGE UP (ref 4.2–5.8)
RBC # FLD: 15 % — HIGH (ref 10.3–14.5)
SODIUM SERPL-SCNC: 140 MMOL/L — SIGNIFICANT CHANGE UP (ref 135–145)
WBC # BLD: 4.48 K/UL — SIGNIFICANT CHANGE UP (ref 3.8–10.5)
WBC # FLD AUTO: 4.48 K/UL — SIGNIFICANT CHANGE UP (ref 3.8–10.5)

## 2021-05-29 PROCEDURE — 99222 1ST HOSP IP/OBS MODERATE 55: CPT

## 2021-05-29 RX ORDER — POTASSIUM PHOSPHATE, MONOBASIC POTASSIUM PHOSPHATE, DIBASIC 236; 224 MG/ML; MG/ML
15 INJECTION, SOLUTION INTRAVENOUS ONCE
Refills: 0 | Status: COMPLETED | OUTPATIENT
Start: 2021-05-29 | End: 2021-05-29

## 2021-05-29 RX ORDER — MAGNESIUM SULFATE 500 MG/ML
2 VIAL (ML) INJECTION
Refills: 0 | Status: COMPLETED | OUTPATIENT
Start: 2021-05-29 | End: 2021-05-29

## 2021-05-29 RX ORDER — LANOLIN ALCOHOL/MO/W.PET/CERES
3 CREAM (GRAM) TOPICAL ONCE
Refills: 0 | Status: COMPLETED | OUTPATIENT
Start: 2021-05-29 | End: 2021-05-29

## 2021-05-29 RX ADMIN — Medication 50 GRAM(S): at 12:39

## 2021-05-29 RX ADMIN — POTASSIUM PHOSPHATE, MONOBASIC POTASSIUM PHOSPHATE, DIBASIC 62.5 MILLIMOLE(S): 236; 224 INJECTION, SOLUTION INTRAVENOUS at 16:27

## 2021-05-29 RX ADMIN — HEPARIN SODIUM 7500 UNIT(S): 5000 INJECTION INTRAVENOUS; SUBCUTANEOUS at 06:01

## 2021-05-29 RX ADMIN — Medication 50 GRAM(S): at 13:40

## 2021-05-29 RX ADMIN — HEPARIN SODIUM 7500 UNIT(S): 5000 INJECTION INTRAVENOUS; SUBCUTANEOUS at 13:57

## 2021-05-29 RX ADMIN — Medication 1 GRAM(S): at 23:10

## 2021-05-29 RX ADMIN — PANTOPRAZOLE SODIUM 40 MILLIGRAM(S): 20 TABLET, DELAYED RELEASE ORAL at 06:02

## 2021-05-29 RX ADMIN — Medication 1 GRAM(S): at 17:45

## 2021-05-29 RX ADMIN — Medication 1 GRAM(S): at 06:02

## 2021-05-29 RX ADMIN — SODIUM CHLORIDE 176 MILLILITER(S): 9 INJECTION, SOLUTION INTRAVENOUS at 06:01

## 2021-05-29 RX ADMIN — Medication 1 GRAM(S): at 12:39

## 2021-05-29 RX ADMIN — ATORVASTATIN CALCIUM 20 MILLIGRAM(S): 80 TABLET, FILM COATED ORAL at 21:43

## 2021-05-29 RX ADMIN — Medication 3 MILLIGRAM(S): at 22:11

## 2021-05-29 RX ADMIN — PANTOPRAZOLE SODIUM 40 MILLIGRAM(S): 20 TABLET, DELAYED RELEASE ORAL at 17:45

## 2021-05-29 NOTE — PROGRESS NOTE ADULT - SUBJECTIVE AND OBJECTIVE BOX
HX: POD  s/p      SUBJECTIVE: Patient seen and examined bedside by chief resident.    heparin   Injectable 7500 Unit(s) SubCutaneous every 8 hours    MEDICATIONS  (PRN):  acetaminophen   Tablet .. 650 milliGRAM(s) Oral every 6 hours PRN Mild Pain (1 - 3)  LORazepam   Injectable 0.5 milliGRAM(s) IV Push every 6 hours PRN Anxiety  ondansetron Injectable 4 milliGRAM(s) IV Push every 6 hours PRN Nausea      I&O's Detail    28 May 2021 07:01  -  29 May 2021 07:00  --------------------------------------------------------  IN:    Lactated Ringers: 3344 mL    Lactated Ringers Bolus: 1000 mL  Total IN: 4344 mL    OUT:    Emesis (mL): 100 mL    Voided (mL): 1300 mL  Total OUT: 1400 mL    Total NET: 2944 mL          Vital Signs Last 24 Hrs  T(C): 37 (29 May 2021 06:00), Max: 37.2 (28 May 2021 21:10)  T(F): 98.6 (29 May 2021 06:00), Max: 99 (28 May 2021 21:10)  HR: 93 (29 May 2021 06:00) (87 - 102)  BP: 118/74 (29 May 2021 06:00) (106/71 - 161/103)  BP(mean): 89 (29 May 2021 06:00) (83 - 89)  RR: 16 (29 May 2021 06:00) (16 - 18)  SpO2: 95% (29 May 2021 06:00) (95% - 98%)    General: NAD, resting comfortably in bed  C/V: pulses present in b/l upper extremities   Pulm: Nonlabored breathing, no respiratory distress  Abd: soft, ND, NT, no rebound or guarding,   Extrem: WWP, no edema, SCDs in place    LABS:                        12.5   4.48  )-----------( 167      ( 29 May 2021 07:43 )             37.9     05-29    140  |  107  |  x   ----------------------------<  x   3.8   |  x   |  x     Ca    8.7      28 May 2021 08:19  Phos  3.0     05-28  Mg     1.6     05-29    TPro  7.5  /  Alb  3.7  /  TBili  0.8  /  DBili  x   /  AST  33  /  ALT  33  /  AlkPhos  84  05-28          RADIOLOGY & ADDITIONAL STUDIES:  CT Abdomen and Pelvis w/ IV Cont:   EXAM:  CT ABDOMEN AND PELVIS IC                          PROCEDURE DATE:  05/28/2021          INTERPRETATION:  CT of the ABDOMEN and PELVIS with intravenous contrast dated 5/28/2021 11:48 AM    INDICATION: Abdominal pain. Dilated common bile duct.    TECHNIQUE: CT of the abdomen and pelvis with intravenous contrast per pancreas protocol images obtained during portal venous phase. Axial, sagittal, and coronal images were obtained and reviewed.    COMPARISON: Correlation with ultrasound May 26, 2021. Comparison with CT abdomen pelvis August 11, 2016.    FINDINGS:    Lower chest: Clear lung bases. Slightly decreased or unchanged long-standing asymmetric mild soft tissue in right posterior mediastinal along T9-T10 vertebral bodies.    Liver: Mildly enlarged liver 19 cm. Smooth in contour. No focal lesion. Portal and hepatic veins are patent.    Biliary system: Upper limit of normal caliber bile duct 0.6 cm with normal distal tapering. No intrahepatic duct dilatation. Normal caliber gallbladder without wall thickening or pericholecystic fluid. No radiopaque stones seen in the gallbladder or common bile duct.    Pancreas: Unremarkable.    Spleen: No significant change mild splenic enlargement in maximal AP plane 13.9 cm Adrenal glands: Unremarkable.    Kidneys: Symmetric parenchymal enhancement. Small right renal cortical cysts and subcentimeter well-circumscribed hypodensity too small to characterize probably cyst.. No hydronephrosis. No renal or ureteral stone.    Bowel/Peritoneum: Normal caliber without evidence of obstruction. No appreciable wall thickening. Normal appendix. No ascites or extraluminal gas.    Lymph nodes: No lymphadenopathy.    Aorta/IVC: Normal caliber. Normal calcific plaque left common iliac artery.    Abdominal wall: No hernia.    Bones/Soft tissues: Degenerative changes of the spine. No aggressive focal lytic or blastic lesion.      IMPRESSION:    1.  No radiopaque stones in the gallbladder or bile duct. Upper limit of normal caliber common bile duct 0.6 cm.  2.  Unchanged mild hepatomegaly and splenomegaly.          Thank you for the opportunity to participate in the care of this patient.      RACHEAL TAYLOR MD, ATTENDING RADIOLOGIST  This document has been electronically signed. May 28 2021 12:11PM (05-28-21 @ 11:48)        Plan:   Diet:   IVF:   Abx:   Pain/Nausea:   Home medications:   AM labs       SUBJECTIVE: Patient seen and examined bedside by chief resident.  +N/+retching/+minimal emesis vs. spit up ON   +V/+BM NB  continued slightly improved epigastric pain   feels mild bloating   +OOBA    heparin   Injectable 7500 Unit(s) SubCutaneous every 8 hours    MEDICATIONS  (PRN):  acetaminophen   Tablet .. 650 milliGRAM(s) Oral every 6 hours PRN Mild Pain (1 - 3)  LORazepam   Injectable 0.5 milliGRAM(s) IV Push every 6 hours PRN Anxiety  ondansetron Injectable 4 milliGRAM(s) IV Push every 6 hours PRN Nausea      I&O's Detail    28 May 2021 07:01  -  29 May 2021 07:00  --------------------------------------------------------  IN:    Lactated Ringers: 3344 mL    Lactated Ringers Bolus: 1000 mL  Total IN: 4344 mL    OUT:    Emesis (mL): 100 mL    Voided (mL): 1300 mL  Total OUT: 1400 mL    Total NET: 2944 mL      Vital Signs Last 24 Hrs  T(C): 37 (29 May 2021 06:00), Max: 37.2 (28 May 2021 21:10)  T(F): 98.6 (29 May 2021 06:00), Max: 99 (28 May 2021 21:10)  HR: 93 (29 May 2021 06:00) (87 - 102)  BP: 118/74 (29 May 2021 06:00) (106/71 - 161/103)  BP(mean): 89 (29 May 2021 06:00) (83 - 89)  RR: 16 (29 May 2021 06:00) (16 - 18)  SpO2: 95% (29 May 2021 06:00) (95% - 98%)    General: NAD, resting comfortably in bed  Pulm: Nonlabored breathing, no respiratory distress on RA   Abd: soft, ND, TTP epigastric and RUQ mild, tattoo x2 dots bordering epigastric, no rebound or guarding   Extrem: WWP, no edema    LABS:                        12.5   4.48  )-----------( 167      ( 29 May 2021 07:43 )             37.9     05-29    140  |  107  |  x   ----------------------------<  x   3.8   |  x   |  x     Ca    8.7      28 May 2021 08:19  Phos  3.0     05-28  Mg     1.6     05-29    TPro  7.5  /  Alb  3.7  /  TBili  0.8  /  DBili  x   /  AST  33  /  ALT  33  /  AlkPhos  84  05-28          RADIOLOGY & ADDITIONAL STUDIES:  CT Abdomen and Pelvis w/ IV Cont:   EXAM:  CT ABDOMEN AND PELVIS IC                          PROCEDURE DATE:  05/28/2021          INTERPRETATION:  CT of the ABDOMEN and PELVIS with intravenous contrast dated 5/28/2021 11:48 AM    INDICATION: Abdominal pain. Dilated common bile duct.    TECHNIQUE: CT of the abdomen and pelvis with intravenous contrast per pancreas protocol images obtained during portal venous phase. Axial, sagittal, and coronal images were obtained and reviewed.    COMPARISON: Correlation with ultrasound May 26, 2021. Comparison with CT abdomen pelvis August 11, 2016.    FINDINGS:    Lower chest: Clear lung bases. Slightly decreased or unchanged long-standing asymmetric mild soft tissue in right posterior mediastinal along T9-T10 vertebral bodies.    Liver: Mildly enlarged liver 19 cm. Smooth in contour. No focal lesion. Portal and hepatic veins are patent.    Biliary system: Upper limit of normal caliber bile duct 0.6 cm with normal distal tapering. No intrahepatic duct dilatation. Normal caliber gallbladder without wall thickening or pericholecystic fluid. No radiopaque stones seen in the gallbladder or common bile duct.    Pancreas: Unremarkable.    Spleen: No significant change mild splenic enlargement in maximal AP plane 13.9 cm Adrenal glands: Unremarkable.    Kidneys: Symmetric parenchymal enhancement. Small right renal cortical cysts and subcentimeter well-circumscribed hypodensity too small to characterize probably cyst.. No hydronephrosis. No renal or ureteral stone.    Bowel/Peritoneum: Normal caliber without evidence of obstruction. No appreciable wall thickening. Normal appendix. No ascites or extraluminal gas.    Lymph nodes: No lymphadenopathy.    Aorta/IVC: Normal caliber. Normal calcific plaque left common iliac artery.    Abdominal wall: No hernia.    Bones/Soft tissues: Degenerative changes of the spine. No aggressive focal lytic or blastic lesion.      IMPRESSION:    1.  No radiopaque stones in the gallbladder or bile duct. Upper limit of normal caliber common bile duct 0.6 cm.  2.  Unchanged mild hepatomegaly and splenomegaly.    A/P:  54 y/o male with PMH s/f Lymphoma in remission(s/p chemo through epigastrium), GERD, HLD presenting to ED with c/o RUQ pain of 4 days duration a/w N/V. RUQ USG today s/f only CBD dilation to 9 mm. MRI pending.     NPO w/ sips/chips/IVF  Pain/nausea control   Severe esophagitis- PPI BID, Carafate QID  Ativan for anxiety  OOBA/IS/SCDs/SQH   AM Labs  GI consult    Plan discussed with attending and chief resident.   ____________________________________________________  Samantha Mcintosh MD     PGY1 - Surgery

## 2021-05-29 NOTE — PROGRESS NOTE ADULT - ATTENDING COMMENTS
54 y/o male with PMH s/f Lymphoma in remission(s/p chemo), GERD, HLD who presented to the ED with c/o RUQ pain of 4 days duration. RUQ US, no cholelithiases or signs of cholecystitis, CBD dilation to 9 mm. Cholecystitis r/o.     Pt continues to have abdominal pain and MRI pending today. Pt denies any cardiac history/chest pain/shortness of breath  Pt stated that he has anaphylaxis to contrast?     # Abdominal pain.     Plan per primary team   MRI today  US with dilated CBD, however no cholelithiasis, lipase negative   ALP and lfts normal  Serial abdominal exam   Pain control   OOB, IS.       #HLD (hyperlipidemia).  Plan: Restart home statin once diet advanced.       #GERD (gastroesophageal reflux disease).  Plan: CW home PPI.     #Anxiety.  Plan: Patient endorses increased anxiety especially with MRI, did not tolerate MRI  If patient need premedication, can use IV ativan prn.     # Elevated blood pressure reading without diagnosis of hypertension.  Plan: Likely component of increased anxiety   No need to treatment at this time   BP controlled now    #: Preoperative clearance. Plan: METs>4, performs ADLs independently   Denies decrease in exercise tolerance   RCRI 1, Class II risk   Can proceed to OR without further cardiac workup.    Dvt prophylaxis

## 2021-05-29 NOTE — PROGRESS NOTE ADULT - SUBJECTIVE AND OBJECTIVE BOX
GASTROENTEROLOGY PROGRESS NOTE  Patient seen and examined at bedside. Denied any new complaints    PERTINENT REVIEW OF SYSTEMS:  As noted above    Allergies    iodine (Anaphylaxis)  IV Contrast (Anaphylaxis)  shellfish (Anaphylaxis)    Intolerances      MEDICATIONS:  MEDICATIONS  (STANDING):  atorvastatin 20 milliGRAM(s) Oral at bedtime  heparin   Injectable 7500 Unit(s) SubCutaneous every 8 hours  lactated ringers. 1000 milliLiter(s) (176 mL/Hr) IV Continuous <Continuous>  melatonin 3 milliGRAM(s) Oral once  pantoprazole  Injectable 40 milliGRAM(s) IV Push two times a day  sucralfate suspension 1 Gram(s) Oral every 6 hours    MEDICATIONS  (PRN):  acetaminophen   Tablet .. 650 milliGRAM(s) Oral every 6 hours PRN Mild Pain (1 - 3)  LORazepam   Injectable 0.5 milliGRAM(s) IV Push every 6 hours PRN Anxiety  ondansetron Injectable 4 milliGRAM(s) IV Push every 6 hours PRN Nausea    Vital Signs Last 24 Hrs  T(C): 37.1 (29 May 2021 20:08), Max: 37.1 (29 May 2021 20:08)  T(F): 98.8 (29 May 2021 20:08), Max: 98.8 (29 May 2021 20:08)  HR: 85 (29 May 2021 20:08) (85 - 95)  BP: 129/83 (29 May 2021 20:08) (118/74 - 129/83)  BP(mean): 89 (29 May 2021 06:00) (89 - 89)  RR: 16 (29 May 2021 20:08) (16 - 17)  SpO2: 97% (29 May 2021 20:08) (95% - 97%)    05-28 @ 07:01  -  05-29 @ 07:00  --------------------------------------------------------  IN: 4344 mL / OUT: 1400 mL / NET: 2944 mL      PHYSICAL EXAM:    General: Well developed; in no acute distress  HEENT: MMM, conjunctiva and sclera clear  Gastrointestinal: Soft non-tender non-distended; No rebound or guarding  Skin: Warm and dry. No obvious rash    LABS:                        12.5   4.48  )-----------( 167      ( 29 May 2021 07:43 )             37.9     05-29    140  |  107  |  13  ----------------------------<  93  3.8   |  19<L>  |  0.91    Ca    8.0<L>      29 May 2021 07:43  Phos  2.4     05-29  Mg     1.6     05-29    TPro  5.4<L>  /  Alb  2.8<L>  /  TBili  0.4  /  DBili  x   /  AST  30  /  ALT  29  /  AlkPhos  64  05-29                      RADIOLOGY & ADDITIONAL STUDIES:  Reviewed

## 2021-05-29 NOTE — PROGRESS NOTE ADULT - ASSESSMENT
54 y/o male with PMH s/f Lmphoma in remission(s/p chemo), GERD, HLD presenting to ED with c/o RUQ pain of 4 days duration. GI consulted for RUQ/epigastric pain.     #RUQ pain  # dysphagia/odynophagia  s/p EGD with Dr Urrutia 5/28/21:  1. Severe LA grade D distal esophagitis  2. Two cm Hill Grade II hiatal hernia  3. Mild gastritis s/p biopsies                 Recommendations:  1. Follow up pathology  2. PPI BID   3. Carafate suspension 1g QID  4. Clear liquid diet  5. Repeat EGD in 6 weeks to ensure healing    # ?biliary dilation  CBD is dilated to 9 mm  - call back GI in case of any concerns on MRI    will sign off for now    Recommendations discussed with primary team  Plan discussed with GI service attending    Deuce Johnson MD  PGY-4 GI fellow  Pager: [phone]

## 2021-05-29 NOTE — PROGRESS NOTE ADULT - SUBJECTIVE AND OBJECTIVE BOX
REYES, FELIX  55y  Male    Patient is a 55y old  Male who presents with a chief complaint of RUQ pain (29 May 2021 08:33)      HPI:  56 y/o male with PMH s/f Lmphoma in remission(s/p chemo), GERD, HLD presenting to ED with c/o RUQ pain of 4 days duration. Sharp pain to RUQ with nausea and vomiting. Multiple NBNB emesis. + fever first two days. no chills. no diarrhea or constipation. no bloody stool or melena. no urinary sx's. pain radiates to back. no cp or sob. no coughing. no further complaints. Patient had similar episodes in the past 6 and 12 months ago and RUQ usg both times was WNL. RUQ today s/f only CBD dilation to 9 mm. Last colonoscopy 2 years ago, 2 polyps, all normal.    Pt continues to have abdominal pain and MRI pending today    In the ED, 36.8, 85, 117/82, 18/96% RA. All labs WNL. 2l Bolus (26 May 2021 13:53)      PAST MEDICAL & SURGICAL HISTORY:  HTN (hypertension)    Lymphoma  2015    No significant past surgical history        Home Medications:  atorvastatin 20 mg oral tablet: 1 tab(s) orally once a day (26 May 2021 14:23)  omeprazole 20 mg oral delayed release tablet: 1 tab(s) orally once a day (26 May 2021 14:23)      55y    FAMILY HISTORY:      Marital Status:  (   )    (   ) Single    (   )    (  )   Lives with: (  ) alone  (  ) children   (  ) spouse   (  ) parents  (  ) other  Recent Travel: No recent travel  Occupation:    Substance Use (street drugs): ( x ) never used  (  ) other:  Tobacco Usage:  ( x  ) never smoked   (   ) former smoker   (   ) current smoker  (     ) pack year  Alcohol Usage: None       MEDICATIONS  (STANDING):  atorvastatin 20 milliGRAM(s) Oral at bedtime  heparin   Injectable 7500 Unit(s) SubCutaneous every 8 hours  lactated ringers. 1000 milliLiter(s) (176 mL/Hr) IV Continuous <Continuous>  pantoprazole  Injectable 40 milliGRAM(s) IV Push two times a day  potassium phosphate IVPB 15 milliMole(s) IV Intermittent once  sucralfate suspension 1 Gram(s) Oral every 6 hours    MEDICATIONS  (PRN):  acetaminophen   Tablet .. 650 milliGRAM(s) Oral every 6 hours PRN Mild Pain (1 - 3)  LORazepam   Injectable 0.5 milliGRAM(s) IV Push every 6 hours PRN Anxiety  ondansetron Injectable 4 milliGRAM(s) IV Push every 6 hours PRN Nausea    REVIEW OF SYSTEMS:  CONSTITUTIONAL: No fever, weight loss, or fatigue  EYES: No eye pain, visual disturbances, or discharge  ENMT:  No difficulty hearing, tinnitus, vertigo; No sinus or throat pain  NECK: No pain or stiffness  BREASTS: No pain, masses, or nipple discharge  RESPIRATORY: No cough, wheezing, chills or hemoptysis; No shortness of breath  CARDIOVASCULAR: No chest pain, palpitations, dizziness, or leg swelling  GASTROINTESTINAL: + abdominal  pain. No nausea, vomiting, or hematemesis; No diarrhea or constipation. No melena or hematochezia.  GENITOURINARY: No dysuria, frequency, hematuria, or incontinence  NEUROLOGICAL: No headaches, memory loss, loss of strength, numbness, or tremors  SKIN: No itching, burning, rashes, or lesions   LYMPH NODES: No enlarged glands  ENDOCRINE: No heat or cold intolerance; No hair loss  MUSCULOSKELETAL: No joint pain or swelling; No muscle, back, or extremity pain  PSYCHIATRIC: No depression, anxiety, mood swings, or difficulty sleeping    Vital Signs Last 24 Hrs  T(C): 36.7 (29 May 2021 08:25), Max: 37.2 (28 May 2021 21:10)  T(F): 98.1 (29 May 2021 08:25), Max: 99 (28 May 2021 21:10)  HR: 95 (29 May 2021 08:25) (93 - 102)  BP: 120/78 (29 May 2021 08:25) (106/71 - 120/78)  BP(mean): 89 (29 May 2021 06:00) (83 - 89)  RR: 17 (29 May 2021 08:25) (16 - 18)  SpO2: 95% (29 May 2021 08:25) (95% - 95%)    PHYSICAL EXAM:  GENERAL: NAD, well-groomed, well-developed  HEAD:  Atraumatic, Normocephalic  EYES: EOMI, PERRLA, conjunctiva and sclera clear  ENMT: No tonsillar erythema, exudates, or enlargement; Moist mucous membranes, Good dentition, No lesions  NECK: Supple, No JVD, Normal thyroid  NERVOUS SYSTEM:  Alert & Oriented X3, Good concentration; Motor Strength 5/5 B/L upper and lower extremities; DTRs 2+ intact and symmetric  CHEST/LUNG: Clear to percussion bilaterally; No rales, rhonchi, wheezing, or rubs  HEART: Regular rate and rhythm; No murmurs, rubs, or gallops  ABDOMEN:RUQ and RLQ tenderness, BS+  EXTREMITIES:  2+ Peripheral Pulses, No clubbing, cyanosis, or edema  LYMPH: No lymphadenopathy noted  SKIN: No rashes or lesions    Consultant(s) Notes Reviewed:  [x ] YES  [ ] NO  Care Discussed with Consultants/Other Providers [ x] YES  [ ] NO    LABS:                        12.5   4.48  )-----------( 167      ( 29 May 2021 07:43 )             37.9     05-29    140  |  107  |  13  ----------------------------<  93  3.8   |  19<L>  |  0.91    Ca    8.0<L>      29 May 2021 07:43  Phos  2.4     05-29  Mg     1.6     05-29    TPro  5.4<L>  /  Alb  2.8<L>  /  TBili  0.4  /  DBili  x   /  AST  30  /  ALT  29  /  AlkPhos  64  05-29        CAPILLARY BLOOD GLUCOSE            RADIOLOGY & ADDITIONAL TESTS:  Echo:        LVSF:        EF:        RVSF:        LA:        RA:        Mitral Valve:        Aortic Valve:       Tricuspid Valve:        Pulmonic Valve:        Pericardium:   Imaging Personally Reviewed:  [ ] YES  [ ] NO

## 2021-05-30 LAB
ANION GAP SERPL CALC-SCNC: 11 MMOL/L — SIGNIFICANT CHANGE UP (ref 5–17)
BUN SERPL-MCNC: 13 MG/DL — SIGNIFICANT CHANGE UP (ref 7–23)
CALCIUM SERPL-MCNC: 8 MG/DL — LOW (ref 8.4–10.5)
CHLORIDE SERPL-SCNC: 106 MMOL/L — SIGNIFICANT CHANGE UP (ref 96–108)
CO2 SERPL-SCNC: 24 MMOL/L — SIGNIFICANT CHANGE UP (ref 22–31)
CREAT SERPL-MCNC: 0.95 MG/DL — SIGNIFICANT CHANGE UP (ref 0.5–1.3)
GLUCOSE SERPL-MCNC: 95 MG/DL — SIGNIFICANT CHANGE UP (ref 70–99)
HCT VFR BLD CALC: 41.8 % — SIGNIFICANT CHANGE UP (ref 39–50)
HGB BLD-MCNC: 13.6 G/DL — SIGNIFICANT CHANGE UP (ref 13–17)
MAGNESIUM SERPL-MCNC: 2.4 MG/DL — SIGNIFICANT CHANGE UP (ref 1.6–2.6)
MCHC RBC-ENTMCNC: 24.9 PG — LOW (ref 27–34)
MCHC RBC-ENTMCNC: 32.5 GM/DL — SIGNIFICANT CHANGE UP (ref 32–36)
MCV RBC AUTO: 76.4 FL — LOW (ref 80–100)
NRBC # BLD: 0 /100 WBCS — SIGNIFICANT CHANGE UP (ref 0–0)
PHOSPHATE SERPL-MCNC: 2.6 MG/DL — SIGNIFICANT CHANGE UP (ref 2.5–4.5)
PLATELET # BLD AUTO: 202 K/UL — SIGNIFICANT CHANGE UP (ref 150–400)
POTASSIUM SERPL-MCNC: 3.7 MMOL/L — SIGNIFICANT CHANGE UP (ref 3.5–5.3)
POTASSIUM SERPL-SCNC: 3.7 MMOL/L — SIGNIFICANT CHANGE UP (ref 3.5–5.3)
RBC # BLD: 5.47 M/UL — SIGNIFICANT CHANGE UP (ref 4.2–5.8)
RBC # FLD: 14.8 % — HIGH (ref 10.3–14.5)
SODIUM SERPL-SCNC: 141 MMOL/L — SIGNIFICANT CHANGE UP (ref 135–145)
WBC # BLD: 5.72 K/UL — SIGNIFICANT CHANGE UP (ref 3.8–10.5)
WBC # FLD AUTO: 5.72 K/UL — SIGNIFICANT CHANGE UP (ref 3.8–10.5)

## 2021-05-30 PROCEDURE — 99221 1ST HOSP IP/OBS SF/LOW 40: CPT

## 2021-05-30 RX ORDER — POTASSIUM CHLORIDE 20 MEQ
10 PACKET (EA) ORAL
Refills: 0 | Status: COMPLETED | OUTPATIENT
Start: 2021-05-30 | End: 2021-05-30

## 2021-05-30 RX ADMIN — Medication 100 MILLIEQUIVALENT(S): at 15:21

## 2021-05-30 RX ADMIN — PANTOPRAZOLE SODIUM 40 MILLIGRAM(S): 20 TABLET, DELAYED RELEASE ORAL at 18:54

## 2021-05-30 RX ADMIN — Medication 650 MILLIGRAM(S): at 18:55

## 2021-05-30 RX ADMIN — Medication 1 GRAM(S): at 06:00

## 2021-05-30 RX ADMIN — HEPARIN SODIUM 7500 UNIT(S): 5000 INJECTION INTRAVENOUS; SUBCUTANEOUS at 13:03

## 2021-05-30 RX ADMIN — Medication 1 GRAM(S): at 23:14

## 2021-05-30 RX ADMIN — Medication 1 GRAM(S): at 18:54

## 2021-05-30 RX ADMIN — Medication 1 GRAM(S): at 13:02

## 2021-05-30 RX ADMIN — PANTOPRAZOLE SODIUM 40 MILLIGRAM(S): 20 TABLET, DELAYED RELEASE ORAL at 06:58

## 2021-05-30 RX ADMIN — Medication 100 MILLIEQUIVALENT(S): at 13:02

## 2021-05-30 RX ADMIN — Medication 650 MILLIGRAM(S): at 19:55

## 2021-05-30 RX ADMIN — HEPARIN SODIUM 7500 UNIT(S): 5000 INJECTION INTRAVENOUS; SUBCUTANEOUS at 05:58

## 2021-05-30 RX ADMIN — ATORVASTATIN CALCIUM 20 MILLIGRAM(S): 80 TABLET, FILM COATED ORAL at 21:45

## 2021-05-30 NOTE — PROGRESS NOTE ADULT - SUBJECTIVE AND OBJECTIVE BOX
POD:   Procedure:     SUBJECTIVE: Patient seen and examined by chief resident on morning rounds.        Vital Signs Last 24 Hrs  T(C): 36.8 (30 May 2021 05:07), Max: 37.1 (29 May 2021 20:08)  T(F): 98.2 (30 May 2021 05:07), Max: 98.8 (29 May 2021 20:08)  HR: 78 (30 May 2021 05:07) (78 - 95)  BP: 127/79 (30 May 2021 05:07) (120/78 - 129/83)  BP(mean): --  RR: 16 (30 May 2021 05:07) (16 - 17)  SpO2: 97% (30 May 2021 05:07) (95% - 97%)    Physical Exam:  General: NAD  Pulmonary: Nonlabored breathing, no respiratory distress  Abdominal: soft, nondistended, nontender with no rebound or guarding  Extremities: WWP, normal strength, no clubbing/cyanosis/edema  Neuro: A/O x3    Lines/drains/tubes:    I&O's Summary    28 May 2021 07:01  -  29 May 2021 07:00  --------------------------------------------------------  IN: 4344 mL / OUT: 1400 mL / NET: 2944 mL    29 May 2021 07:01  -  30 May 2021 06:19  --------------------------------------------------------  IN: 880 mL / OUT: 350 mL / NET: 530 mL        LABS:                        12.5   4.48  )-----------( 167      ( 29 May 2021 07:43 )             37.9     05-29    140  |  107  |  13  ----------------------------<  93  3.8   |  19<L>  |  0.91    Ca    8.0<L>      29 May 2021 07:43  Phos  2.4     05-29  Mg     1.6     05-29    TPro  5.4<L>  /  Alb  2.8<L>  /  TBili  0.4  /  DBili  x   /  AST  30  /  ALT  29  /  AlkPhos  64  05-29        CAPILLARY BLOOD GLUCOSE        LIVER FUNCTIONS - ( 29 May 2021 07:43 )  Alb: 2.8 g/dL / Pro: 5.4 g/dL / ALK PHOS: 64 U/L / ALT: 29 U/L / AST: 30 U/L / GGT: x             RADIOLOGY & ADDITIONAL STUDIES:     SUBJECTIVE: Yesterday the decision was made not to go for MRI. The patient had several episodes of retching and nausea and vomiting following Carafate. He is passing flatus and has liquid bowel movements. The patient ambulates well. His R mid abdominal tenderness is improving. He was examined at the bedside by the chief resident. The patient denies having chest pain, SOB, dizziness, chills.    Vital Signs Last 24 Hrs  T(C): 36.8 (30 May 2021 05:07), Max: 37.1 (29 May 2021 20:08)  T(F): 98.2 (30 May 2021 05:07), Max: 98.8 (29 May 2021 20:08)  HR: 78 (30 May 2021 05:07) (78 - 95)  BP: 127/79 (30 May 2021 05:07) (120/78 - 129/83)  BP(mean): --  RR: 16 (30 May 2021 05:07) (16 - 17)  SpO2: 97% (30 May 2021 05:07) (95% - 97%)    Physical Exam:  General: NAD, resting comfortably in the bed  Pulmonary: Nonlabored breathing, no respiratory distress  Abdominal: soft, ND, mild TTP in the R mid abdomen, no rebound tenderness, guarding, rigidity  Extremities: WWP, normal strength, no clubbing/cyanosis/edema  Neuro: AAOx3    Lines/drains/tubes:    I&O's Summary    28 May 2021 07:01  -  29 May 2021 07:00  --------------------------------------------------------  IN: 4344 mL / OUT: 1400 mL / NET: 2944 mL    29 May 2021 07:01  -  30 May 2021 06:19  --------------------------------------------------------  IN: 880 mL / OUT: 350 mL / NET: 530 mL        LABS:                        12.5   4.48  )-----------( 167      ( 29 May 2021 07:43 )             37.9     05-29    140  |  107  |  13  ----------------------------<  93  3.8   |  19<L>  |  0.91    Ca    8.0<L>      29 May 2021 07:43  Phos  2.4     05-29  Mg     1.6     05-29    TPro  5.4<L>  /  Alb  2.8<L>  /  TBili  0.4  /  DBili  x   /  AST  30  /  ALT  29  /  AlkPhos  64  05-29        CAPILLARY BLOOD GLUCOSE        LIVER FUNCTIONS - ( 29 May 2021 07:43 )  Alb: 2.8 g/dL / Pro: 5.4 g/dL / ALK PHOS: 64 U/L / ALT: 29 U/L / AST: 30 U/L / GGT: x             RADIOLOGY & ADDITIONAL STUDIES:

## 2021-05-30 NOTE — PROGRESS NOTE ADULT - ATTENDING COMMENTS
56 y/o male with PMH s/f Lymphoma in remission(s/p chemo), GERD, HLD who presented to the ED with c/o RUQ pain of 4 days duration. RUQ US, no cholelithiases or signs of cholecystitis, CBD dilation to 9 mm. Cholecystitis r/o. MRI pending    Pt continues to have abdominal pain and MRI pending. Pt denies any cardiac history/chest pain/shortness of breath  Pt stated that he has anaphylaxis to contrast?     # Abdominal pain.     Plan per primary team   CT abdomen unremarkable  US with dilated CBD, however no cholelithiasis, lipase negative   GI consult appreciated  s/p endoscopy showed  Grade 4 esophagitis  cont ppi   MRI pending    Serial abdominal exam   Pain control   OOB, IS.       #HLD (hyperlipidemia).  Plan: Restart home statin once diet advanced.       #GERD (gastroesophageal reflux disease).  Plan: CW home PPI.     #Anxiety.  Plan: Patient endorses increased anxiety especially with MRI, did not tolerate MRI  If patient need premedication, can use IV ativan prn.     # Elevated blood pressure reading without diagnosis of hypertension.  Plan: Likely component of increased anxiety   No need to treatment at this time   BP controlled now    #: Preoperative clearance. Plan: METs>4, performs ADLs independently   Denies decrease in exercise tolerance   RCRI 1, Class II risk   Can proceed to OR without further cardiac workup.    Dvt prophylaxis. 56 y/o male with PMH s/f Lymphoma in remission(s/p chemo), GERD, HLD who presented to the ED with c/o RUQ pain of 4 days duration. RUQ US, no cholelithiases or signs of cholecystitis, CBD dilation to 9 mm. Cholecystitis r/o. MRI pending    Pt continues to have abdominal pain and dysphagia. MRI pending. Pt denies any cardiac history/chest pain/shortness of breath  Pt stated that he has anaphylaxis to contrast?     # Abdominal pain.    esophagitis  Dysphagia     Plan per primary team   CT abdomen unremarkable  US with dilated CBD, however no cholelithiasis, lipase negative   GI consult appreciated  s/p endoscopy showed  cont ppi   MRI pending    Serial abdominal exam   Pain control   OOB, IS.       #HLD (hyperlipidemia).  Plan: Restart home statin once diet advanced.     #h/oLmphoma in remission(s/p chemo),      #GERD (gastroesophageal reflux disease).  Plan: CW home PPI.     #Anxiety.  Plan: Patient endorses increased anxiety especially with MRI, did not tolerate MRI  If patient need premedication, can use IV ativan prn.     # Elevated blood pressure reading without diagnosis of hypertension.  Plan: Likely component of increased anxiety   No need to treatment at this time   BP controlled now    #: Preoperative clearance. Plan: METs>4, performs ADLs independently   Denies decrease in exercise tolerance   RCRI 1, Class II risk   Can proceed to OR without further cardiac workup.    Dvt prophylaxis.

## 2021-05-30 NOTE — PROGRESS NOTE ADULT - ASSESSMENT
REYES, FELIX  55y  Male    Patient is a 55y old  Male who presents with a chief complaint of RUQ pain (30 May 2021 06:18)      HPI:  56 y/o male with PMH s/f Lmphoma in remission(s/p chemo), GERD, HLD presenting to ED with c/o RUQ pain of 4 days duration. Sharp pain to RUQ with nausea and vomiting. Multiple NBNB emesis. + fever first two days. no chills. no diarrhea or constipation. no bloody stool or melena. no urinary sx's. pain radiates to back. no cp or sob. no coughing. no further complaints. Patient had similar episodes in the past 6 and 12 months ago and RUQ usg both times was WNL. RUQ today s/f only CBD dilation to 9 mm. Last colonoscopy 2 years ago, 2 polyps, all normal.    In the ED, 36.8, 85, 117/82, 18/96% RA. All labs WNL. 2l Bolus (26 May 2021 13:53)      PAST MEDICAL & SURGICAL HISTORY:  HTN (hypertension)    Lymphoma  2015    No significant past surgical history        Home Medications:  atorvastatin 20 mg oral tablet: 1 tab(s) orally once a day (26 May 2021 14:23)  omeprazole 20 mg oral delayed release tablet: 1 tab(s) orally once a day (26 May 2021 14:23)      55y    FAMILY HISTORY:      Marital Status:  (   )    (   ) Single    (   )    (  )   Lives with: (  ) alone  (  ) children   (  ) spouse   (  ) parents  (  ) other  Recent Travel: No recent travel  Occupation:    Substance Use (street drugs): ( x ) never used  (  ) other:  Tobacco Usage:  ( x  ) never smoked   (   ) former smoker   (   ) current smoker  (     ) pack year  Alcohol Usage: None       MEDICATIONS  (STANDING):  atorvastatin 20 milliGRAM(s) Oral at bedtime  heparin   Injectable 7500 Unit(s) SubCutaneous every 8 hours  lactated ringers. 1000 milliLiter(s) (176 mL/Hr) IV Continuous <Continuous>  pantoprazole  Injectable 40 milliGRAM(s) IV Push two times a day  sucralfate suspension 1 Gram(s) Oral every 6 hours    MEDICATIONS  (PRN):  acetaminophen   Tablet .. 650 milliGRAM(s) Oral every 6 hours PRN Mild Pain (1 - 3)  LORazepam   Injectable 0.5 milliGRAM(s) IV Push every 6 hours PRN Anxiety  ondansetron Injectable 4 milliGRAM(s) IV Push every 6 hours PRN Nausea    REVIEW OF SYSTEMS:  CONSTITUTIONAL: No fever, weight loss, or fatigue  EYES: No eye pain, visual disturbances, or discharge  ENMT:  No difficulty hearing, tinnitus, vertigo; No sinus or throat pain  NECK: No pain or stiffness  BREASTS: No pain, masses, or nipple discharge  RESPIRATORY: No cough, wheezing, chills or hemoptysis; No shortness of breath  CARDIOVASCULAR: No chest pain, palpitations, dizziness, or leg swelling  GASTROINTESTINAL: No abdominal or epigastric pain. No nausea, vomiting, or hematemesis; No diarrhea or constipation. No melena or hematochezia.  GENITOURINARY: No dysuria, frequency, hematuria, or incontinence  NEUROLOGICAL: No headaches, memory loss, loss of strength, numbness, or tremors  SKIN: No itching, burning, rashes, or lesions   LYMPH NODES: No enlarged glands  ENDOCRINE: No heat or cold intolerance; No hair loss  MUSCULOSKELETAL: No joint pain or swelling; No muscle, back, or extremity pain  PSYCHIATRIC: No depression, anxiety, mood swings, or difficulty sleeping    Vital Signs Last 24 Hrs  T(C): 37.2 (30 May 2021 20:06), Max: 37.2 (30 May 2021 20:06)  T(F): 98.9 (30 May 2021 20:06), Max: 98.9 (30 May 2021 20:06)  HR: 84 (30 May 2021 20:06) (78 - 88)  BP: 139/90 (30 May 2021 20:06) (114/74 - 139/90)  BP(mean): --  RR: 16 (30 May 2021 20:06) (16 - 17)  SpO2: 97% (30 May 2021 20:06) (96% - 97%)    PHYSICAL EXAM:  GENERAL: NAD, well-groomed, well-developed  HEAD:  Atraumatic, Normocephalic  EYES: EOMI, PERRLA, conjunctiva and sclera clear  ENMT: No tonsillar erythema, exudates, or enlargement; Moist mucous membranes, Good dentition, No lesions  NECK: Supple, No JVD, Normal thyroid  NERVOUS SYSTEM:  Alert & Oriented X3, Good concentration; Motor Strength 5/5 B/L upper and lower extremities; DTRs 2+ intact and symmetric  CHEST/LUNG: Clear to percussion bilaterally; No rales, rhonchi, wheezing, or rubs  HEART: Regular rate and rhythm; No murmurs, rubs, or gallops  ABDOMEN: Soft, Rt sided tenderness. No guarding  EXTREMITIES:  2+ Peripheral Pulses, No clubbing, cyanosis, or edema  LYMPH: No lymphadenopathy noted  SKIN: No rashes or lesions    Consultant(s) Notes Reviewed:  [x ] YES  [ ] NO  Care Discussed with Consultants/Other Providers [ x] YES  [ ] NO    LABS:                        13.6   5.72  )-----------( 202      ( 30 May 2021 08:10 )             41.8     05-30    141  |  106  |  13  ----------------------------<  95  3.7   |  24  |  0.95    Ca    8.0<L>      30 May 2021 08:10  Phos  2.6     05-30  Mg     2.4     05-30    TPro  5.4<L>  /  Alb  2.8<L>  /  TBili  0.4  /  DBili  x   /  AST  30  /  ALT  29  /  AlkPhos  64  05-29        CAPILLARY BLOOD GLUCOSE            RADIOLOGY & ADDITIONAL TESTS:  Echo:        LVSF:        EF:        RVSF:        LA:        RA:        Mitral Valve:        Aortic Valve:       Tricuspid Valve:        Pulmonic Valve:        Pericardium:   Imaging Personally Reviewed:  [ ] YES  [ ] NO

## 2021-05-30 NOTE — PROGRESS NOTE ADULT - ASSESSMENT
56 yo M w/ PMH s/f Lymphoma in remission (s/p chemo through epigastrium), GERD, HLD presenting to ED w/ c/o RUQ pain of 4 days duration a/w N/V. RUQ USG today s/f only CBD dilation to 9 mm.     NPO w/ sips/chips/IVF  Pain/nausea control   Severe esophagitis - PPI BID, Carafate QID  Ativan for anxiety  OOBA/IS/SCDs/SQH   AM Labs  GI consult   56 yo M w/ PMH s/f Lymphoma in remission (s/p chemo through epigastrium), GERD, HLD presenting to ED w/ c/o RUQ pain of 4 days duration a/w N/V. RUQ USG today s/f only CBD dilation to 9 mm.     NPO w/ sips/chips/IVF  Pain/nausea control   Severe esophagitis - PPI BID, Carafate QID  Ativan for anxiety  OOBA/IS/SCDs/SQH   AM Labs  GI consult - follow up EGD biopsy

## 2021-05-31 LAB
ANION GAP SERPL CALC-SCNC: 14 MMOL/L — SIGNIFICANT CHANGE UP (ref 5–17)
BUN SERPL-MCNC: 10 MG/DL — SIGNIFICANT CHANGE UP (ref 7–23)
CALCIUM SERPL-MCNC: 8.2 MG/DL — LOW (ref 8.4–10.5)
CHLORIDE SERPL-SCNC: 104 MMOL/L — SIGNIFICANT CHANGE UP (ref 96–108)
CO2 SERPL-SCNC: 22 MMOL/L — SIGNIFICANT CHANGE UP (ref 22–31)
CREAT SERPL-MCNC: 0.89 MG/DL — SIGNIFICANT CHANGE UP (ref 0.5–1.3)
GLUCOSE SERPL-MCNC: 81 MG/DL — SIGNIFICANT CHANGE UP (ref 70–99)
HCT VFR BLD CALC: 41.5 % — SIGNIFICANT CHANGE UP (ref 39–50)
HGB BLD-MCNC: 13.7 G/DL — SIGNIFICANT CHANGE UP (ref 13–17)
MAGNESIUM SERPL-MCNC: 2.1 MG/DL — SIGNIFICANT CHANGE UP (ref 1.6–2.6)
MCHC RBC-ENTMCNC: 25.1 PG — LOW (ref 27–34)
MCHC RBC-ENTMCNC: 33 GM/DL — SIGNIFICANT CHANGE UP (ref 32–36)
MCV RBC AUTO: 76.1 FL — LOW (ref 80–100)
NRBC # BLD: 0 /100 WBCS — SIGNIFICANT CHANGE UP (ref 0–0)
PHOSPHATE SERPL-MCNC: 2.6 MG/DL — SIGNIFICANT CHANGE UP (ref 2.5–4.5)
PLATELET # BLD AUTO: 194 K/UL — SIGNIFICANT CHANGE UP (ref 150–400)
POTASSIUM SERPL-MCNC: 4 MMOL/L — SIGNIFICANT CHANGE UP (ref 3.5–5.3)
POTASSIUM SERPL-SCNC: 4 MMOL/L — SIGNIFICANT CHANGE UP (ref 3.5–5.3)
RBC # BLD: 5.45 M/UL — SIGNIFICANT CHANGE UP (ref 4.2–5.8)
RBC # FLD: 14.9 % — HIGH (ref 10.3–14.5)
SODIUM SERPL-SCNC: 140 MMOL/L — SIGNIFICANT CHANGE UP (ref 135–145)
WBC # BLD: 5.66 K/UL — SIGNIFICANT CHANGE UP (ref 3.8–10.5)
WBC # FLD AUTO: 5.66 K/UL — SIGNIFICANT CHANGE UP (ref 3.8–10.5)

## 2021-05-31 PROCEDURE — 99221 1ST HOSP IP/OBS SF/LOW 40: CPT

## 2021-05-31 RX ADMIN — PANTOPRAZOLE SODIUM 40 MILLIGRAM(S): 20 TABLET, DELAYED RELEASE ORAL at 17:59

## 2021-05-31 RX ADMIN — Medication 1 GRAM(S): at 17:59

## 2021-05-31 RX ADMIN — Medication 650 MILLIGRAM(S): at 21:32

## 2021-05-31 RX ADMIN — ATORVASTATIN CALCIUM 20 MILLIGRAM(S): 80 TABLET, FILM COATED ORAL at 21:32

## 2021-05-31 RX ADMIN — PANTOPRAZOLE SODIUM 40 MILLIGRAM(S): 20 TABLET, DELAYED RELEASE ORAL at 05:30

## 2021-05-31 RX ADMIN — Medication 1 GRAM(S): at 22:42

## 2021-05-31 RX ADMIN — Medication 650 MILLIGRAM(S): at 22:32

## 2021-05-31 RX ADMIN — Medication 1 GRAM(S): at 11:23

## 2021-05-31 RX ADMIN — Medication 1 GRAM(S): at 05:30

## 2021-05-31 NOTE — PROGRESS NOTE ADULT - ASSESSMENT
56 y/o male with PMH s/f Lymphoma in remission(s/p chemo), GERD, HLD who presented to the ED with c/o RUQ pain of 4 days duration. RUQ US, no cholelithiases or signs of cholecystitis, CBD dilation to 9 mm. Cholecystitis r/o. MRI pending    Pt continues to have abdominal pain and dysphagia. MRI pending. Pt denies any cardiac history/chest pain/shortness of breath. Tolerating liquid diet  Pt stated that he has anaphylaxis to contrast?     # Abdominal pain.    esophagitis  Dysphagia     Plan per primary team   CT abdomen unremarkable  US with dilated CBD, however no cholelithiasis, lipase negative   GI consult appreciated  s/p endoscopy showed  cont ppi   MRI pending?    Serial abdominal exam   Pain control   OOB, IS.       #HLD (hyperlipidemia).  Plan: Restart home statin once diet advanced.     #h/oLmphoma in remission(s/p chemo),      #GERD (gastroesophageal reflux disease).  Plan: CW home PPI.     #Anxiety.  Plan: Patient endorses increased anxiety especially with MRI, did not tolerate MRI  If patient need premedication, can use IV ativan prn.     # Elevated blood pressure reading without diagnosis of hypertension.  Plan: Likely component of increased anxiety   No need to treatment at this time   BP controlled now    #: Preoperative clearance. Plan: METs>4, performs ADLs independently   Denies decrease in exercise tolerance   RCRI 1, Class II risk   Can proceed to OR without further cardiac workup.    Dvt prophylaxis.

## 2021-05-31 NOTE — PROGRESS NOTE ADULT - SUBJECTIVE AND OBJECTIVE BOX
SUBJECTIVE: Patient seen and examined bedside by chief resident.    heparin   Injectable 7500 Unit(s) SubCutaneous every 8 hours    MEDICATIONS  (PRN):  acetaminophen   Tablet .. 650 milliGRAM(s) Oral every 6 hours PRN Mild Pain (1 - 3)  LORazepam   Injectable 0.5 milliGRAM(s) IV Push every 6 hours PRN Anxiety  ondansetron Injectable 4 milliGRAM(s) IV Push every 6 hours PRN Nausea      I&O's Detail    29 May 2021 07:01  -  30 May 2021 07:00  --------------------------------------------------------  IN:    Lactated Ringers: 2112 mL  Total IN: 2112 mL    OUT:    Voided (mL): 750 mL  Total OUT: 750 mL    Total NET: 1362 mL      30 May 2021 07:01  -  31 May 2021 06:17  --------------------------------------------------------  IN:    Lactated Ringers: 1936 mL  Total IN: 1936 mL    OUT:    Voided (mL): 1300 mL  Total OUT: 1300 mL    Total NET: 636 mL          Vital Signs Last 24 Hrs  T(C): 36.8 (31 May 2021 05:06), Max: 37.2 (30 May 2021 20:06)  T(F): 98.2 (31 May 2021 05:06), Max: 98.9 (30 May 2021 20:06)  HR: 90 (31 May 2021 05:06) (84 - 90)  BP: 142/88 (31 May 2021 05:06) (114/74 - 142/88)  BP(mean): --  RR: 15 (31 May 2021 05:06) (15 - 17)  SpO2: 97% (31 May 2021 05:06) (96% - 97%)    General: NAD, resting comfortably in bed  C/V: Regular rate  Pulm: Nonlabored breathing, no respiratory distress  Abd: soft, ND, NT  Extrem: WWP, no edema, SCDs in place    LABS:                        13.6   5.72  )-----------( 202      ( 30 May 2021 08:10 )             41.8     05-30    141  |  106  |  13  ----------------------------<  95  3.7   |  24  |  0.95    Ca    8.0<L>      30 May 2021 08:10  Phos  2.6     05-30  Mg     2.4     05-30    TPro  5.4<L>  /  Alb  2.8<L>  /  TBili  0.4  /  DBili  x   /  AST  30  /  ALT  29  /  AlkPhos  64  05-29          RADIOLOGY & ADDITIONAL STUDIES:  CT Abdomen and Pelvis w/ IV Cont:   EXAM:  CT ABDOMEN AND PELVIS IC                          PROCEDURE DATE:  05/28/2021          INTERPRETATION:  CT of the ABDOMEN and PELVIS with intravenous contrast dated 5/28/2021 11:48 AM    INDICATION: Abdominal pain. Dilated common bile duct.    TECHNIQUE: CT of the abdomen and pelvis with intravenous contrast per pancreas protocol images obtained during portal venous phase. Axial, sagittal, and coronal images were obtained and reviewed.    COMPARISON: Correlation with ultrasound May 26, 2021. Comparison with CT abdomen pelvis August 11, 2016.    FINDINGS:    Lower chest: Clear lung bases. Slightly decreased or unchanged long-standing asymmetric mild soft tissue in right posterior mediastinal along T9-T10 vertebral bodies.    Liver: Mildly enlarged liver 19 cm. Smooth in contour. No focal lesion. Portal and hepatic veins are patent.    Biliary system: Upper limit of normal caliber bile duct 0.6 cm with normal distal tapering. No intrahepatic duct dilatation. Normal caliber gallbladder without wall thickening or pericholecystic fluid. No radiopaque stones seen in the gallbladder or common bile duct.    Pancreas: Unremarkable.    Spleen: No significant change mild splenic enlargement in maximal AP plane 13.9 cm Adrenal glands: Unremarkable.    Kidneys: Symmetric parenchymal enhancement. Small right renal cortical cysts and subcentimeter well-circumscribed hypodensity too small to characterize probably cyst.. No hydronephrosis. No renal or ureteral stone.    Bowel/Peritoneum: Normal caliber without evidence of obstruction. No appreciable wall thickening. Normal appendix. No ascites or extraluminal gas.    Lymph nodes: No lymphadenopathy.    Aorta/IVC: Normal caliber. Normal calcific plaque left common iliac artery.    Abdominal wall: No hernia.    Bones/Soft tissues: Degenerative changes of the spine. No aggressive focal lytic or blastic lesion.      IMPRESSION:    1.  No radiopaque stones in the gallbladder or bile duct. Upper limit of normal caliber common bile duct 0.6 cm.  2.  Unchanged mild hepatomegaly and splenomegaly.          Thank you for the opportunity to participate in the care of this patient.      RACHEAL TAYLOR MD, ATTENDING RADIOLOGIST  This document has been electronically signed. May 28 2021 12:11PM (05-28-21 @ 11:48)     SUBJECTIVE: Patient seen and examined bedside by chief resident, feels well, endorses hunger. Pain under control. No nausea or vomiting.     heparin   Injectable 7500 Unit(s) SubCutaneous every 8 hours    MEDICATIONS  (PRN):  acetaminophen   Tablet .. 650 milliGRAM(s) Oral every 6 hours PRN Mild Pain (1 - 3)  LORazepam   Injectable 0.5 milliGRAM(s) IV Push every 6 hours PRN Anxiety  ondansetron Injectable 4 milliGRAM(s) IV Push every 6 hours PRN Nausea      I&O's Detail    29 May 2021 07:01  -  30 May 2021 07:00  --------------------------------------------------------  IN:    Lactated Ringers: 2112 mL  Total IN: 2112 mL    OUT:    Voided (mL): 750 mL  Total OUT: 750 mL    Total NET: 1362 mL      30 May 2021 07:01  -  31 May 2021 06:17  --------------------------------------------------------  IN:    Lactated Ringers: 1936 mL  Total IN: 1936 mL    OUT:    Voided (mL): 1300 mL  Total OUT: 1300 mL    Total NET: 636 mL          Vital Signs Last 24 Hrs  T(C): 36.8 (31 May 2021 05:06), Max: 37.2 (30 May 2021 20:06)  T(F): 98.2 (31 May 2021 05:06), Max: 98.9 (30 May 2021 20:06)  HR: 90 (31 May 2021 05:06) (84 - 90)  BP: 142/88 (31 May 2021 05:06) (114/74 - 142/88)  BP(mean): --  RR: 15 (31 May 2021 05:06) (15 - 17)  SpO2: 97% (31 May 2021 05:06) (96% - 97%)    General: NAD, resting comfortably in bed  C/V: Regular rate  Pulm: Nonlabored breathing, no respiratory distress  Abd: soft, ND, NT  Extrem: WWP, no edema, SCDs in place    LABS:                        13.6   5.72  )-----------( 202      ( 30 May 2021 08:10 )             41.8     05-30    141  |  106  |  13  ----------------------------<  95  3.7   |  24  |  0.95    Ca    8.0<L>      30 May 2021 08:10  Phos  2.6     05-30  Mg     2.4     05-30    TPro  5.4<L>  /  Alb  2.8<L>  /  TBili  0.4  /  DBili  x   /  AST  30  /  ALT  29  /  AlkPhos  64  05-29          RADIOLOGY & ADDITIONAL STUDIES:  CT Abdomen and Pelvis w/ IV Cont:   EXAM:  CT ABDOMEN AND PELVIS IC                          PROCEDURE DATE:  05/28/2021          INTERPRETATION:  CT of the ABDOMEN and PELVIS with intravenous contrast dated 5/28/2021 11:48 AM    INDICATION: Abdominal pain. Dilated common bile duct.    TECHNIQUE: CT of the abdomen and pelvis with intravenous contrast per pancreas protocol images obtained during portal venous phase. Axial, sagittal, and coronal images were obtained and reviewed.    COMPARISON: Correlation with ultrasound May 26, 2021. Comparison with CT abdomen pelvis August 11, 2016.    FINDINGS:    Lower chest: Clear lung bases. Slightly decreased or unchanged long-standing asymmetric mild soft tissue in right posterior mediastinal along T9-T10 vertebral bodies.    Liver: Mildly enlarged liver 19 cm. Smooth in contour. No focal lesion. Portal and hepatic veins are patent.    Biliary system: Upper limit of normal caliber bile duct 0.6 cm with normal distal tapering. No intrahepatic duct dilatation. Normal caliber gallbladder without wall thickening or pericholecystic fluid. No radiopaque stones seen in the gallbladder or common bile duct.    Pancreas: Unremarkable.    Spleen: No significant change mild splenic enlargement in maximal AP plane 13.9 cm Adrenal glands: Unremarkable.    Kidneys: Symmetric parenchymal enhancement. Small right renal cortical cysts and subcentimeter well-circumscribed hypodensity too small to characterize probably cyst.. No hydronephrosis. No renal or ureteral stone.    Bowel/Peritoneum: Normal caliber without evidence of obstruction. No appreciable wall thickening. Normal appendix. No ascites or extraluminal gas.    Lymph nodes: No lymphadenopathy.    Aorta/IVC: Normal caliber. Normal calcific plaque left common iliac artery.    Abdominal wall: No hernia.    Bones/Soft tissues: Degenerative changes of the spine. No aggressive focal lytic or blastic lesion.      IMPRESSION:    1.  No radiopaque stones in the gallbladder or bile duct. Upper limit of normal caliber common bile duct 0.6 cm.  2.  Unchanged mild hepatomegaly and splenomegaly.          Thank you for the opportunity to participate in the care of this patient.      RACHEAL TAYLOR MD, ATTENDING RADIOLOGIST  This document has been electronically signed. May 28 2021 12:11PM (05-28-21 @ 11:48)

## 2021-05-31 NOTE — PROGRESS NOTE ADULT - SUBJECTIVE AND OBJECTIVE BOX
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 Sharp pain to RUQ with nausea and vomiting. Multiple NBNB emesis. + fever first two days. no chills. no diarrhea or constipation. no bloody stool or melena. no urinary sx's. pain radiates to back. no cp or sob. no coughing. no further complaints. Patient had similar episodes in the past 6 and 12 months ago and RUQ usg both times was WNL. RUQ today s/f only CBD dilation to 9 mm. Last colonoscopy 2 years ago, 2 polyps, all normal.    In the ED, 36.8, 85, 117/82, 18/96% RA. All labs WNL. 2l Bolus (26 May 2021 13:53)      PAST MEDICAL & SURGICAL HISTORY:  HTN (hypertension)    Lymphoma  2015    No significant past surgical history        Home Medications:  atorvastatin 20 mg oral tablet: 1 tab(s) orally once a day (26 May 2021 14:23)  omeprazole 20 mg oral delayed release tablet: 1 tab(s) orally once a day (26 May 2021 14:23)      55y    FAMILY HISTORY:      Marital Status:  (   )    (   ) Single    (   )    (  )   Lives with: (  ) alone  (  ) children   (  ) spouse   (  ) parents  (  ) other  Recent Travel: No recent travel  Occupation:    Substance Use (street drugs): ( x ) never used  (  ) other:  Tobacco Usage:  ( x  ) never smoked   (   ) former smoker   (   ) current smoker  (     ) pack year  Alcohol Usage: None       MEDICATIONS  (STANDING):  atorvastatin 20 milliGRAM(s) Oral at bedtime  heparin   Injectable 7500 Unit(s) SubCutaneous every 8 hours  lactated ringers. 1000 milliLiter(s) (176 mL/Hr) IV Continuous <Continuous>  pantoprazole  Injectable 40 milliGRAM(s) IV Push two times a day  sucralfate suspension 1 Gram(s) Oral every 6 hours    MEDICATIONS  (PRN):  acetaminophen   Tablet .. 650 milliGRAM(s) Oral every 6 hours PRN Mild Pain (1 - 3)  LORazepam   Injectable 0.5 milliGRAM(s) IV Push every 6 hours PRN Anxiety  ondansetron Injectable 4 milliGRAM(s) IV Push every 6 hours PRN Nausea    REVIEW OF SYSTEMS:  CONSTITUTIONAL: No fever, weight loss, or fatigue  EYES: No eye pain, visual disturbances, or discharge  ENMT:  No difficulty hearing, tinnitus, vertigo; No sinus or throat pain  NECK: No pain or stiffness  BREASTS: No pain, masses, or nipple discharge  RESPIRATORY: No cough, wheezing, chills or hemoptysis; No shortness of breath  CARDIOVASCULAR: No chest pain, palpitations, dizziness, or leg swelling  GASTROINTESTINAL: No abdominal or epigastric pain. No nausea, vomiting, or hematemesis; No diarrhea or constipation. No melena or hematochezia.  GENITOURINARY: No dysuria, frequency, hematuria, or incontinence  NEUROLOGICAL: No headaches, memory loss, loss of strength, numbness, or tremors  SKIN: No itching, burning, rashes, or lesions   LYMPH NODES: No enlarged glands  ENDOCRINE: No heat or cold intolerance; No hair loss  MUSCULOSKELETAL: No joint pain or swelling; No muscle, back, or extremity pain  PSYCHIATRIC: No depression, anxiety, mood swings, or difficulty sleeping    Vital Signs Last 24 Hrs  T(C): 36.9 (31 May 2021 08:10), Max: 37.2 (30 May 2021 20:06)  T(F): 98.5 (31 May 2021 08:10), Max: 98.9 (30 May 2021 20:06)  HR: 86 (31 May 2021 08:10) (84 - 90)  BP: 142/84 (31 May 2021 08:10) (133/84 - 142/88)  BP(mean): --  RR: 17 (31 May 2021 08:10) (15 - 17)  SpO2: 97% (31 May 2021 08:10) (96% - 97%)    PHYSICAL EXAM:  GENERAL: NAD, well-groomed, well-developed  HEAD:  Atraumatic, Normocephalic  EYES: EOMI, PERRLA, conjunctiva and sclera clear  ENMT: No tonsillar erythema, exudates, or enlargement; Moist mucous membranes, Good dentition, No lesions  NECK: Supple, No JVD, Normal thyroid  NERVOUS SYSTEM:  Alert & Oriented X3, Good concentration; Motor Strength 5/5 B/L upper and lower extremities; DTRs 2+ intact and symmetric  CHEST/LUNG: Clear to percussion bilaterally; No rales, rhonchi, wheezing, or rubs  HEART: Regular rate and rhythm; No murmurs, rubs, or gallops  ABDOMEN: Soft,RUQ tenderness,Nondistended; Bowel sounds present  EXTREMITIES:  2+ Peripheral Pulses, No clubbing, cyanosis, or edema  LYMPH: No lymphadenopathy noted  SKIN: No rashes or lesions    Consultant(s) Notes Reviewed:  [x ] YES  [ ] NO  Care Discussed with Consultants/Other Providers [ x] YES  [ ] NO    LABS:                        13.7   5.66  )-----------( 194      ( 31 May 2021 06:50 )             41.5     05-31    140  |  104  |  10  ----------------------------<  81  4.0   |  22  |  0.89    Ca    8.2<L>      31 May 2021 06:50  Phos  2.6     05-31  Mg     2.1     05-31          CAPILLARY BLOOD GLUCOSE            RADIOLOGY & ADDITIONAL TESTS:  Echo:        LVSF:        EF:        RVSF:        LA:        RA:        Mitral Valve:        Aortic Valve:       Tricuspid Valve:        Pulmonic Valve:        Pericardium:   Imaging Personally Reviewed:  [ ] YES  [ ] NO

## 2021-06-01 ENCOUNTER — TRANSCRIPTION ENCOUNTER (OUTPATIENT)
Age: 56
End: 2021-06-01

## 2021-06-01 LAB
ALBUMIN SERPL ELPH-MCNC: 3.3 G/DL — SIGNIFICANT CHANGE UP (ref 3.3–5)
ALP SERPL-CCNC: 77 U/L — SIGNIFICANT CHANGE UP (ref 40–120)
ALT FLD-CCNC: 93 U/L — HIGH (ref 10–45)
ANION GAP SERPL CALC-SCNC: 13 MMOL/L — SIGNIFICANT CHANGE UP (ref 5–17)
AST SERPL-CCNC: 74 U/L — HIGH (ref 10–40)
BILIRUB SERPL-MCNC: 0.4 MG/DL — SIGNIFICANT CHANGE UP (ref 0.2–1.2)
BUN SERPL-MCNC: 9 MG/DL — SIGNIFICANT CHANGE UP (ref 7–23)
CALCIUM SERPL-MCNC: 8.4 MG/DL — SIGNIFICANT CHANGE UP (ref 8.4–10.5)
CHLORIDE SERPL-SCNC: 102 MMOL/L — SIGNIFICANT CHANGE UP (ref 96–108)
CO2 SERPL-SCNC: 23 MMOL/L — SIGNIFICANT CHANGE UP (ref 22–31)
CREAT SERPL-MCNC: 0.87 MG/DL — SIGNIFICANT CHANGE UP (ref 0.5–1.3)
GLUCOSE SERPL-MCNC: 85 MG/DL — SIGNIFICANT CHANGE UP (ref 70–99)
HCT VFR BLD CALC: 43.2 % — SIGNIFICANT CHANGE UP (ref 39–50)
HGB BLD-MCNC: 14.1 G/DL — SIGNIFICANT CHANGE UP (ref 13–17)
MAGNESIUM SERPL-MCNC: 2 MG/DL — SIGNIFICANT CHANGE UP (ref 1.6–2.6)
MCHC RBC-ENTMCNC: 24.9 PG — LOW (ref 27–34)
MCHC RBC-ENTMCNC: 32.6 GM/DL — SIGNIFICANT CHANGE UP (ref 32–36)
MCV RBC AUTO: 76.3 FL — LOW (ref 80–100)
NRBC # BLD: 0 /100 WBCS — SIGNIFICANT CHANGE UP (ref 0–0)
PHOSPHATE SERPL-MCNC: 2.9 MG/DL — SIGNIFICANT CHANGE UP (ref 2.5–4.5)
PLATELET # BLD AUTO: 217 K/UL — SIGNIFICANT CHANGE UP (ref 150–400)
POTASSIUM SERPL-MCNC: 3.7 MMOL/L — SIGNIFICANT CHANGE UP (ref 3.5–5.3)
POTASSIUM SERPL-SCNC: 3.7 MMOL/L — SIGNIFICANT CHANGE UP (ref 3.5–5.3)
PROT SERPL-MCNC: 6.6 G/DL — SIGNIFICANT CHANGE UP (ref 6–8.3)
RBC # BLD: 5.66 M/UL — SIGNIFICANT CHANGE UP (ref 4.2–5.8)
RBC # FLD: 14.6 % — HIGH (ref 10.3–14.5)
SODIUM SERPL-SCNC: 138 MMOL/L — SIGNIFICANT CHANGE UP (ref 135–145)
SURGICAL PATHOLOGY STUDY: SIGNIFICANT CHANGE UP
WBC # BLD: 5.43 K/UL — SIGNIFICANT CHANGE UP (ref 3.8–10.5)
WBC # FLD AUTO: 5.43 K/UL — SIGNIFICANT CHANGE UP (ref 3.8–10.5)

## 2021-06-01 PROCEDURE — 93005 ELECTROCARDIOGRAM TRACING: CPT

## 2021-06-01 PROCEDURE — U0003: CPT

## 2021-06-01 PROCEDURE — 88305 TISSUE EXAM BY PATHOLOGIST: CPT

## 2021-06-01 PROCEDURE — 83735 ASSAY OF MAGNESIUM: CPT

## 2021-06-01 PROCEDURE — 85025 COMPLETE CBC W/AUTO DIFF WBC: CPT

## 2021-06-01 PROCEDURE — 36415 COLL VENOUS BLD VENIPUNCTURE: CPT

## 2021-06-01 PROCEDURE — 81001 URINALYSIS AUTO W/SCOPE: CPT

## 2021-06-01 PROCEDURE — U0005: CPT

## 2021-06-01 PROCEDURE — 99285 EMERGENCY DEPT VISIT HI MDM: CPT | Mod: 25

## 2021-06-01 PROCEDURE — 80053 COMPREHEN METABOLIC PANEL: CPT

## 2021-06-01 PROCEDURE — 86769 SARS-COV-2 COVID-19 ANTIBODY: CPT

## 2021-06-01 PROCEDURE — 96375 TX/PRO/DX INJ NEW DRUG ADDON: CPT

## 2021-06-01 PROCEDURE — 74177 CT ABD & PELVIS W/CONTRAST: CPT

## 2021-06-01 PROCEDURE — 83690 ASSAY OF LIPASE: CPT

## 2021-06-01 PROCEDURE — 84100 ASSAY OF PHOSPHORUS: CPT

## 2021-06-01 PROCEDURE — 99232 SBSQ HOSP IP/OBS MODERATE 35: CPT | Mod: GC

## 2021-06-01 PROCEDURE — 80048 BASIC METABOLIC PNL TOTAL CA: CPT

## 2021-06-01 PROCEDURE — 85027 COMPLETE CBC AUTOMATED: CPT

## 2021-06-01 PROCEDURE — 96374 THER/PROPH/DIAG INJ IV PUSH: CPT

## 2021-06-01 PROCEDURE — 76705 ECHO EXAM OF ABDOMEN: CPT

## 2021-06-01 RX ORDER — OMEPRAZOLE 10 MG/1
1 CAPSULE, DELAYED RELEASE ORAL
Qty: 0 | Refills: 0 | DISCHARGE

## 2021-06-01 RX ORDER — POTASSIUM CHLORIDE 20 MEQ
20 PACKET (EA) ORAL ONCE
Refills: 0 | Status: COMPLETED | OUTPATIENT
Start: 2021-06-01 | End: 2021-06-01

## 2021-06-01 RX ORDER — SUCRALFATE 1 G
10 TABLET ORAL
Qty: 1120 | Refills: 0
Start: 2021-06-01 | End: 2021-06-28

## 2021-06-01 RX ORDER — PANTOPRAZOLE SODIUM 20 MG/1
1 TABLET, DELAYED RELEASE ORAL
Qty: 56 | Refills: 0
Start: 2021-06-01 | End: 2021-06-28

## 2021-06-01 RX ORDER — PANTOPRAZOLE SODIUM 20 MG/1
40 TABLET, DELAYED RELEASE ORAL
Refills: 0 | Status: DISCONTINUED | OUTPATIENT
Start: 2021-06-01 | End: 2021-06-02

## 2021-06-01 RX ADMIN — ATORVASTATIN CALCIUM 20 MILLIGRAM(S): 80 TABLET, FILM COATED ORAL at 21:06

## 2021-06-01 RX ADMIN — Medication 1 GRAM(S): at 23:00

## 2021-06-01 RX ADMIN — ONDANSETRON 4 MILLIGRAM(S): 8 TABLET, FILM COATED ORAL at 21:13

## 2021-06-01 RX ADMIN — Medication 1 GRAM(S): at 05:43

## 2021-06-01 RX ADMIN — Medication 20 MILLIEQUIVALENT(S): at 09:22

## 2021-06-01 RX ADMIN — SODIUM CHLORIDE 176 MILLILITER(S): 9 INJECTION, SOLUTION INTRAVENOUS at 11:11

## 2021-06-01 RX ADMIN — Medication 1 GRAM(S): at 17:16

## 2021-06-01 RX ADMIN — Medication 1 GRAM(S): at 11:11

## 2021-06-01 RX ADMIN — PANTOPRAZOLE SODIUM 40 MILLIGRAM(S): 20 TABLET, DELAYED RELEASE ORAL at 17:16

## 2021-06-01 RX ADMIN — PANTOPRAZOLE SODIUM 40 MILLIGRAM(S): 20 TABLET, DELAYED RELEASE ORAL at 05:43

## 2021-06-01 NOTE — DISCHARGE NOTE PROVIDER - NSDCMRMEDTOKEN_GEN_ALL_CORE_FT
atorvastatin 20 mg oral tablet: 1 tab(s) orally once a day  omeprazole 20 mg oral delayed release tablet: 1 tab(s) orally once a day   atorvastatin 20 mg oral tablet: 1 tab(s) orally once a day  pantoprazole 40 mg oral delayed release tablet: 1 tab(s) orally 2 times a day   sucralfate 1 g/10 mL oral suspension: 10 milliliter(s) orally 4 times a day

## 2021-06-01 NOTE — PROGRESS NOTE ADULT - PROBLEM SELECTOR PLAN 1
Plan per primary team   US with dilated CBD, however no cholelithiasis, lipase negative   Could not tolerate MR, awaiting CT for eval  Serial abdominal exam   Pain control   OOB, IS Plan per primary team   US with dilated CBD, however no cholelithiasis, lipase negative   EGD with gastritis  PPI BID   Carafate suspension 1g QID  Serial abdominal exam   GI following, appreciate recs  Pain control   OOB, IS

## 2021-06-01 NOTE — PROGRESS NOTE ADULT - SUBJECTIVE AND OBJECTIVE BOX
SUBJECTIVE: Patient seen and examined bedside. Patient reports that his abdominal pain is better but he is still having mild epigastric pain. Patient is passing flatus and having bowel movements. Patient is intermittently nauseous with clear liquid diet.    heparin   Injectable 7500 Unit(s) SubCutaneous every 8 hours      Vital Signs Last 24 Hrs  T(C): 36.7 (01 Jun 2021 08:44), Max: 36.9 (31 May 2021 20:14)  T(F): 98 (01 Jun 2021 08:44), Max: 98.5 (31 May 2021 20:14)  HR: 98 (01 Jun 2021 08:44) (76 - 98)  BP: 107/73 (01 Jun 2021 08:44) (107/73 - 142/83)  BP(mean): 96 (01 Jun 2021 04:51) (96 - 100)  RR: 18 (01 Jun 2021 08:44) (16 - 18)  SpO2: 97% (01 Jun 2021 08:44) (97% - 98%)  I&O's Detail    31 May 2021 07:01  -  01 Jun 2021 07:00  --------------------------------------------------------  IN:    Oral Fluid: 480 mL  Total IN: 480 mL    OUT:    Voided (mL): 1750 mL  Total OUT: 1750 mL    Total NET: -1270 mL          General: NAD, resting comfortably in bed  C/V: NSR  Pulm: Nonlabored breathing, no respiratory distress  Abd: soft, nondistended, minimally tender to palpation in epigastric region.  Extrem: WWP, no edema, SCDs in place        LABS:                        14.1   5.43  )-----------( 217      ( 01 Jun 2021 06:55 )             43.2     06-01    138  |  102  |  9   ----------------------------<  85  3.7   |  23  |  0.87    Ca    8.4      01 Jun 2021 06:55  Phos  2.9     06-01  Mg     2.0     06-01    TPro  6.6  /  Alb  3.3  /  TBili  0.4  /  DBili  x   /  AST  74<H>  /  ALT  93<H>  /  AlkPhos  77  06-01          RADIOLOGY & ADDITIONAL STUDIES:

## 2021-06-01 NOTE — PROGRESS NOTE ADULT - ASSESSMENT
56 y/o male with PMH s/f Lymphoma in remission(s/p chemo through epigastrium), GERD, HLD presenting to ED with c/o RUQ pain of 4 days duration a/w N/V. RUQ USG today s/f only CBD dilation to 9 mm. Now s/p EGD, severe Esophagitis, Hiatal Hernia and gastritis, now on Carafate and double dose PPI.     CLD  Pain/nausea control   PPI BID, Carafate QID  Ativan for anxiety  OOBA/IS/SCDs/SQH   AM Labs

## 2021-06-01 NOTE — DISCHARGE NOTE NURSING/CASE MANAGEMENT/SOCIAL WORK - PATIENT PORTAL LINK FT
You can access the FollowMyHealth Patient Portal offered by [facility] by registering at the following website: [email or web address]. By joining [facility]’s FollowMyHealth portal, you will also be able to view your health information using other applications (apps) compatible with our system.

## 2021-06-01 NOTE — DISCHARGE NOTE PROVIDER - CARE PROVIDER_API CALL
Jhony Leon  SURGERY  [address] [place] [address]  Phone: [phone]  Fax: [phone]  Follow Up Time:

## 2021-06-01 NOTE — PROGRESS NOTE ADULT - SUBJECTIVE AND OBJECTIVE BOX
Patient is a 55y old  Male who presents with a chief complaint of RUQ pain (01 Jun 2021 11:21)      INTERVAL HPI/OVERNIGHT EVENTS:  Patient was seen and examined at bedside. As per nurse and patient, no o/n events, patient resting comfortably. No complaints at this time. Patient denies: fever, chills, dizziness, weakness, HA, Changes in vision, CP, palpitations, SOB, cough, N/V/D/C, dysuria, changes in bowel movements, LE edema.      PAST MEDICAL & SURGICAL HISTORY:  HTN (hypertension)    Lymphoma  2015    No significant past surgical history        SOCIAL HISTORY  Alcohol:  Tobacco:  Illicit substance use:      FAMILY HISTORY:    REVIEW OF SYSTEMS:  CONSTITUTIONAL: No fever, weight loss, or fatigue  EYES: No eye pain, visual disturbances, or discharge  ENMT:  No difficulty hearing, tinnitus, vertigo; No sinus or throat pain  NECK: No pain or stiffness  RESPIRATORY: No cough, wheezing, chills or hemoptysis; No shortness of breath  CARDIOVASCULAR: No chest pain, palpitations, dizziness, or leg swelling  GASTROINTESTINAL: No abdominal or epigastric pain. No nausea, vomiting, or hematemesis; No diarrhea or constipation. No melena or hematochezia.  GENITOURINARY: No dysuria, frequency, hematuria, or incontinence  NEUROLOGICAL: No headaches, memory loss, loss of strength, numbness, or tremors  SKIN: No itching, burning, rashes, or lesions   LYMPH NODES: No enlarged glands  ENDOCRINE: No heat or cold intolerance; No hair loss  MUSCULOSKELETAL: No joint pain or swelling; No muscle, back, or extremity pain  PSYCHIATRIC: No depression, anxiety, mood swings, or difficulty sleeping  HEME/LYMPH: No easy bruising, or bleeding gums  ALLERY AND IMMUNOLOGIC: No hives or eczema    T(C): 36.7 (06-01-21 @ 08:44), Max: 36.9 (05-31-21 @ 20:14)  HR: 98 (06-01-21 @ 08:44) (76 - 98)  BP: 107/73 (06-01-21 @ 08:44) (107/73 - 142/83)  RR: 18 (06-01-21 @ 08:44) (16 - 18)  SpO2: 97% (06-01-21 @ 08:44) (97% - 98%)  Wt(kg): --  I&O's Summary    31 May 2021 07:01  -  01 Jun 2021 07:00  --------------------------------------------------------  IN: 480 mL / OUT: 1750 mL / NET: -1270 mL    01 Jun 2021 07:01  -  01 Jun 2021 12:29  --------------------------------------------------------  IN: 1120 mL / OUT: 700 mL / NET: 420 mL        PHYSICAL EXAM:  GENERAL: NAD, laying comfortably in bed  HEAD:  Atraumatic, Normocephalic  EYES: EOMI, PERRLA, conjunctiva and sclera clear  ENMT: No tonsillar erythema, exudates, or enlargement; MMM  NECK: Supple, No JVD  NERVOUS SYSTEM:  Alert & Oriented X3, no focal deficits   CHEST/LUNG: Clear to percussion bilaterally; No rales, rhonchi, wheezing, or rubs  HEART: Regular rate and rhythm; No murmurs, rubs, or gallops  ABDOMEN: Soft, Nontender, Nondistended; Bowel sounds present  EXTREMITIES:  2+ Peripheral Pulses, No clubbing, cyanosis, or edema  LYMPH: No lymphadenopathy noted  SKIN: No rashes or lesions        LABS:                        14.1   5.43  )-----------( 217      ( 01 Jun 2021 06:55 )             43.2     06-01    138  |  102  |  9   ----------------------------<  85  3.7   |  23  |  0.87    Ca    8.4      01 Jun 2021 06:55  Phos  2.9     06-01  Mg     2.0     06-01    TPro  6.6  /  Alb  3.3  /  TBili  0.4  /  DBili  x   /  AST  74<H>  /  ALT  93<H>  /  AlkPhos  77  06-01        CAPILLARY BLOOD GLUCOSE                MEDICATIONS  (STANDING):  atorvastatin 20 milliGRAM(s) Oral at bedtime  heparin   Injectable 7500 Unit(s) SubCutaneous every 8 hours  lactated ringers. 1000 milliLiter(s) (176 mL/Hr) IV Continuous <Continuous>  pantoprazole    Tablet 40 milliGRAM(s) Oral two times a day  sucralfate suspension 1 Gram(s) Oral every 6 hours    MEDICATIONS  (PRN):  acetaminophen   Tablet .. 650 milliGRAM(s) Oral every 6 hours PRN Mild Pain (1 - 3)  LORazepam   Injectable 0.5 milliGRAM(s) IV Push every 6 hours PRN Anxiety  ondansetron Injectable 4 milliGRAM(s) IV Push every 6 hours PRN Nausea      RADIOLOGY & ADDITIONAL TESTS:    Imaging Personally Reviewed:  [ ] YES  [ ] NO    Consultant(s) Notes Reviewed:  [ ] YES  [ ] NO    Care Discussed with Consultants/Other Providers [ ] YES  [ ] NO Patient is a 55y old  Male who presents with a chief complaint of RUQ pain (01 Jun 2021 11:21)      INTERVAL HPI/OVERNIGHT EVENTS:  Patient was seen and examined at bedside. As per nurse and patient, no o/n events, patient resting comfortably. Still with mild abdominal pain however has improved since admission. Patient denies: fever, chills, dizziness, weakness, HA, Changes in vision, CP, palpitations, SOB, cough, dysuria, changes in bowel movements, LE edema.      PAST MEDICAL & SURGICAL HISTORY:  HTN (hypertension)    Lymphoma  2015    No significant past surgical history        SOCIAL HISTORY  Alcohol:  Tobacco:  Illicit substance use:      FAMILY HISTORY:    REVIEW OF SYSTEMS:  CONSTITUTIONAL: No fever, weight loss, or fatigue  EYES: No eye pain, visual disturbances, or discharge  ENMT:  No difficulty hearing, tinnitus, vertigo; No sinus or throat pain  NECK: No pain or stiffness  RESPIRATORY: No cough, wheezing, chills or hemoptysis; No shortness of breath  CARDIOVASCULAR: No chest pain, palpitations, dizziness, or leg swelling  GASTROINTESTINAL: No abdominal or epigastric pain. No nausea, vomiting, or hematemesis; No diarrhea or constipation. No melena or hematochezia.  GENITOURINARY: No dysuria, frequency, hematuria, or incontinence  NEUROLOGICAL: No headaches, memory loss, loss of strength, numbness, or tremors  SKIN: No itching, burning, rashes, or lesions   LYMPH NODES: No enlarged glands  ENDOCRINE: No heat or cold intolerance; No hair loss  MUSCULOSKELETAL: No joint pain or swelling; No muscle, back, or extremity pain  PSYCHIATRIC: No depression, anxiety, mood swings, or difficulty sleeping  HEME/LYMPH: No easy bruising, or bleeding gums  ALLERY AND IMMUNOLOGIC: No hives or eczema    T(C): 36.7 (06-01-21 @ 08:44), Max: 36.9 (05-31-21 @ 20:14)  HR: 98 (06-01-21 @ 08:44) (76 - 98)  BP: 107/73 (06-01-21 @ 08:44) (107/73 - 142/83)  RR: 18 (06-01-21 @ 08:44) (16 - 18)  SpO2: 97% (06-01-21 @ 08:44) (97% - 98%)  Wt(kg): --  I&O's Summary    31 May 2021 07:01  -  01 Jun 2021 07:00  --------------------------------------------------------  IN: 480 mL / OUT: 1750 mL / NET: -1270 mL    01 Jun 2021 07:01  -  01 Jun 2021 12:29  --------------------------------------------------------  IN: 1120 mL / OUT: 700 mL / NET: 420 mL        PHYSICAL EXAM:  GENERAL: NAD, laying comfortably in bed  HEAD:  Atraumatic, Normocephalic  EYES: EOMI, PERRLA, conjunctiva and sclera clear  ENMT: No tonsillar erythema, exudates, or enlargement; MMM  NECK: Supple, No JVD  NERVOUS SYSTEM:  Alert & Oriented X3, no focal deficits   CHEST/LUNG: Clear to percussion bilaterally; No rales, rhonchi, wheezing, or rubs  HEART: Regular rate and rhythm; No murmurs, rubs, or gallops  ABDOMEN: Soft, Nontender, Nondistended; Bowel sounds present  EXTREMITIES:  2+ Peripheral Pulses, No clubbing, cyanosis, or edema  LYMPH: No lymphadenopathy noted  SKIN: No rashes or lesions        LABS:                        14.1   5.43  )-----------( 217      ( 01 Jun 2021 06:55 )             43.2     06-01    138  |  102  |  9   ----------------------------<  85  3.7   |  23  |  0.87    Ca    8.4      01 Jun 2021 06:55  Phos  2.9     06-01  Mg     2.0     06-01    TPro  6.6  /  Alb  3.3  /  TBili  0.4  /  DBili  x   /  AST  74<H>  /  ALT  93<H>  /  AlkPhos  77  06-01        CAPILLARY BLOOD GLUCOSE                MEDICATIONS  (STANDING):  atorvastatin 20 milliGRAM(s) Oral at bedtime  heparin   Injectable 7500 Unit(s) SubCutaneous every 8 hours  lactated ringers. 1000 milliLiter(s) (176 mL/Hr) IV Continuous <Continuous>  pantoprazole    Tablet 40 milliGRAM(s) Oral two times a day  sucralfate suspension 1 Gram(s) Oral every 6 hours    MEDICATIONS  (PRN):  acetaminophen   Tablet .. 650 milliGRAM(s) Oral every 6 hours PRN Mild Pain (1 - 3)  LORazepam   Injectable 0.5 milliGRAM(s) IV Push every 6 hours PRN Anxiety  ondansetron Injectable 4 milliGRAM(s) IV Push every 6 hours PRN Nausea      RADIOLOGY & ADDITIONAL TESTS:    Imaging Personally Reviewed:  [ ] YES  [ ] NO    Consultant(s) Notes Reviewed:  [ ] YES  [ ] NO    Care Discussed with Consultants/Other Providers [ ] YES  [ ] NO

## 2021-06-01 NOTE — DISCHARGE NOTE PROVIDER - NSDCCPCAREPLAN_GEN_ALL_CORE_FT
PRINCIPAL DISCHARGE DIAGNOSIS  Diagnosis: Abdominal pain  Assessment and Plan of Treatment: Please resume all regular home medications   Please take carafate as prescribed.  Please take PPI as presribed.   Please get plenty of rest, continue to ambulate several times per day, and drink adequate amounts of fluids. Avoid lifting weights greater than 5-10 lbs until you follow-up with your surgeon, who will instruct you further regarding activity restrictions.  Please follow-up with your surgeon Dr. Leon in 1-2 weeks as needed. Please call his office at [phone] to schedule an appointment.

## 2021-06-01 NOTE — DISCHARGE NOTE PROVIDER - NSDCFUADDINST_GEN_ALL_CORE_FT
Please call your doctor or nurse practitioner if you experience the following: You experience new chest pain, pressure, squeezing or tightness., New or worsening cough, shortness of breath, or wheeze. If you are vomiting and cannot keep down fluids or your medications. You are getting dehydrated due to continued vomiting, diarrhea, or other reasons. Signs of dehydration include dry mouth, rapid heartbeat, or feeling dizzy or faint when standing.You see blood or dark/black material when you vomit or have a bowel movement.  You experience burning when you urinate, have blood in your urine, or experience a discharge.  Your pain is not improving within 8-12 hours or is not gone within 24 hours. Call or return immediately if your pain is getting worse, changes location, or moves to your chest or back.  You have shaking chills, or fever greater than 101.5 degrees Fahrenheit or 38 degrees Celsius.  Any change in your symptoms, or any new symptoms that concern you.

## 2021-06-02 VITALS
DIASTOLIC BLOOD PRESSURE: 92 MMHG | HEART RATE: 86 BPM | SYSTOLIC BLOOD PRESSURE: 141 MMHG | TEMPERATURE: 98 F | OXYGEN SATURATION: 97 % | RESPIRATION RATE: 17 BRPM

## 2021-06-02 PROCEDURE — 99232 SBSQ HOSP IP/OBS MODERATE 35: CPT | Mod: GC

## 2021-06-02 NOTE — PROGRESS NOTE ADULT - NSICDXPILOT_GEN_ALL_CORE
[place]

## 2021-06-02 NOTE — PROGRESS NOTE ADULT - PROBLEM SELECTOR PLAN 4
Patient endorses increased anxiety especially with MRI, did not tolerate MRI  If patient need premedication, can use IV ativan prn

## 2021-06-02 NOTE — PROGRESS NOTE ADULT - REASON FOR ADMISSION
RUQ pain

## 2021-06-02 NOTE — PROGRESS NOTE ADULT - SUBJECTIVE AND OBJECTIVE BOX
Patient is a 55y old  Male who presents with a chief complaint of RUQ pain (02 Jun 2021 07:36)      INTERVAL HPI/OVERNIGHT EVENTS:  Patient was seen and examined at bedside. As per nurse and patient, no o/n events, patient resting comfortably. Intermittent nausea that has improved. Patient denies: fever, chills, dizziness, weakness, HA, Changes in vision, CP, palpitations, SOB, cough, dysuria, changes in bowel movements, LE edema.      PAST MEDICAL & SURGICAL HISTORY:  HTN (hypertension)    Lymphoma  2015    No significant past surgical history      T(C): 36.7 (06-02-21 @ 08:37), Max: 36.9 (06-01-21 @ 15:21)  HR: 86 (06-02-21 @ 08:37) (86 - 88)  BP: 141/92 (06-02-21 @ 08:37) (132/84 - 144/83)  RR: 17 (06-02-21 @ 08:37) (16 - 17)  SpO2: 97% (06-02-21 @ 08:37) (97% - 98%)  Wt(kg): --  I&O's Summary    01 Jun 2021 07:01  -  02 Jun 2021 07:00  --------------------------------------------------------  IN: 2952 mL / OUT: 2775 mL / NET: 177 mL        PHYSICAL EXAM:  GENERAL: NAD, laying comfortably in bed  HEAD:  Atraumatic, Normocephalic  EYES: EOMI, PERRLA, conjunctiva and sclera clear  ENMT: No tonsillar erythema, exudates, or enlargement; MMM  NECK: Supple, No JVD  NERVOUS SYSTEM:  Alert & Oriented X3, no focal deficits   CHEST/LUNG: Clear to percussion bilaterally; No rales, rhonchi, wheezing, or rubs  HEART: Regular rate and rhythm; No murmurs, rubs, or gallops  ABDOMEN: Soft, Nontender, Nondistended; Bowel sounds present  EXTREMITIES:  2+ Peripheral Pulses, No clubbing, cyanosis, or edema  LYMPH: No lymphadenopathy noted  SKIN: No rashes or lesions        LABS:                        14.1   5.43  )-----------( 217      ( 01 Jun 2021 06:55 )             43.2     06-01    138  |  102  |  9   ----------------------------<  85  3.7   |  23  |  0.87    Ca    8.4      01 Jun 2021 06:55  Phos  2.9     06-01  Mg     2.0     06-01    TPro  6.6  /  Alb  3.3  /  TBili  0.4  /  DBili  x   /  AST  74<H>  /  ALT  93<H>  /  AlkPhos  77  06-01        CAPILLARY BLOOD GLUCOSE                MEDICATIONS  (STANDING):  atorvastatin 20 milliGRAM(s) Oral at bedtime  heparin   Injectable 7500 Unit(s) SubCutaneous every 8 hours  pantoprazole    Tablet 40 milliGRAM(s) Oral two times a day  sucralfate suspension 1 Gram(s) Oral every 6 hours    MEDICATIONS  (PRN):  acetaminophen   Tablet .. 650 milliGRAM(s) Oral every 6 hours PRN Mild Pain (1 - 3)  LORazepam   Injectable 0.5 milliGRAM(s) IV Push every 6 hours PRN Anxiety  ondansetron Injectable 4 milliGRAM(s) IV Push every 6 hours PRN Nausea      RADIOLOGY & ADDITIONAL TESTS:    Imaging Personally Reviewed:  [ ] YES  [ ] NO    Consultant(s) Notes Reviewed:  [ ] YES  [ ] NO    Care Discussed with Consultants/Other Providers [ ] YES  [ ] NO

## 2021-06-02 NOTE — PROGRESS NOTE ADULT - SUBJECTIVE AND OBJECTIVE BOX
24 hr events:  ON: Arthur diet, SIMON, Stable vitals,   6/1: advanced to soft      SUBJECTIVE:  Pt seen and examined by chief resident. Pt is doing well, resting comfortably on bed. Pain controlled. Diet tolerated. Some nausea last night but now improved. No complaints at this time.    Vital Signs Last 24 Hrs  T(C): 36.7 (02 Jun 2021 04:59), Max: 36.9 (01 Jun 2021 15:21)  T(F): 98 (02 Jun 2021 04:59), Max: 98.5 (01 Jun 2021 19:59)  HR: 87 (02 Jun 2021 04:59) (87 - 98)  BP: 143/86 (02 Jun 2021 04:59) (107/73 - 144/83)  BP(mean): --  RR: 16 (02 Jun 2021 04:59) (16 - 18)  SpO2: 97% (02 Jun 2021 04:59) (97% - 98%)    Physical Exam:  General: NAD  Pulmonary: Nonlabored breathing, no respiratory distress  Abdominal: soft, NT/ND,  Extremities: WWP, SCDs in place    I&O's Summary    01 Jun 2021 07:01  -  02 Jun 2021 07:00  --------------------------------------------------------  IN: 2952 mL / OUT: 2775 mL / NET: 177 mL        LABS:                        14.1   5.43  )-----------( 217      ( 01 Jun 2021 06:55 )             43.2     06-01    138  |  102  |  9   ----------------------------<  85  3.7   |  23  |  0.87    Ca    8.4      01 Jun 2021 06:55  Phos  2.9     06-01  Mg     2.0     06-01    TPro  6.6  /  Alb  3.3  /  TBili  0.4  /  DBili  x   /  AST  74<H>  /  ALT  93<H>  /  AlkPhos  77  06-01    LIVER FUNCTIONS - ( 01 Jun 2021 06:55 )  Alb: 3.3 g/dL / Pro: 6.6 g/dL / ALK PHOS: 77 U/L / ALT: 93 U/L / AST: 74 U/L / GGT: x

## 2021-06-02 NOTE — PROGRESS NOTE ADULT - PROBLEM SELECTOR PLAN 6
METs>4, performs ADLs independently   Denies decrease in exercise tolerance   RCRI 1, Class II risk   Can proceed to OR without further cardiac workup

## 2021-06-02 NOTE — PROGRESS NOTE ADULT - PROVIDER SPECIALTY LIST ADULT
Surgery
Gastroenterology
Hospitalist
Surgery
Surgery
Hospitalist

## 2021-06-02 NOTE — PROGRESS NOTE ADULT - ASSESSMENT
56 y/o male with PMH s/f Lymphoma in remission(s/p chemo through epigastrium), GERD, HLD presenting to ED with c/o RUQ pain of 4 days duration a/w N/V. RUQ USG today s/f only CBD dilation to 9 mm. Now s/p EGD, severe Esophagitis, Hiatal Hernia and gastritis, now on Carafate and double dose PPI.     Soft  Pain/nausea control   PPI BID, Carafate QID  Ativan for anxiety  OOBA/IS/SCDs/SQH   DC home today

## 2021-06-02 NOTE — PROGRESS NOTE ADULT - PROBLEM SELECTOR PLAN 1
Plan per primary team   US with dilated CBD, however no cholelithiasis, lipase negative   EGD with gastritis  PPI BID   Carafate suspension 1g QID  Serial abdominal exam   GI following, appreciate recs  Pain control   OOB, IS

## 2021-06-02 NOTE — PROGRESS NOTE ADULT - PROBLEM SELECTOR PROBLEM 5
Elevated blood pressure reading without diagnosis of hypertension

## 2021-06-09 DIAGNOSIS — E66.9 OBESITY, UNSPECIFIED: ICD-10-CM

## 2021-06-09 DIAGNOSIS — K21.00 GASTRO-ESOPHAGEAL REFLUX DISEASE WITH ESOPHAGITIS, WITHOUT BLEEDING: ICD-10-CM

## 2021-06-09 DIAGNOSIS — Z91.013 ALLERGY TO SEAFOOD: ICD-10-CM

## 2021-06-09 DIAGNOSIS — R10.9 UNSPECIFIED ABDOMINAL PAIN: ICD-10-CM

## 2021-06-09 DIAGNOSIS — K83.8 OTHER SPECIFIED DISEASES OF BILIARY TRACT: ICD-10-CM

## 2021-06-09 DIAGNOSIS — K29.70 GASTRITIS, UNSPECIFIED, WITHOUT BLEEDING: ICD-10-CM

## 2021-06-09 DIAGNOSIS — Z91.041 RADIOGRAPHIC DYE ALLERGY STATUS: ICD-10-CM

## 2021-06-09 DIAGNOSIS — C85.90 NON-HODGKIN LYMPHOMA, UNSPECIFIED, UNSPECIFIED SITE: ICD-10-CM

## 2021-06-09 DIAGNOSIS — E78.5 HYPERLIPIDEMIA, UNSPECIFIED: ICD-10-CM

## 2021-06-09 DIAGNOSIS — K44.9 DIAPHRAGMATIC HERNIA WITHOUT OBSTRUCTION OR GANGRENE: ICD-10-CM

## 2021-06-09 DIAGNOSIS — F41.9 ANXIETY DISORDER, UNSPECIFIED: ICD-10-CM

## 2021-10-26 NOTE — ED ADULT TRIAGE NOTE - WEIGHT IN KG
Called listed number and spoke to patient about scheduling an appt. Patient stated she is in the hospital right now and will call once she is out of the hospital.  
129.3

## 2021-12-26 ENCOUNTER — EMERGENCY (EMERGENCY)
Facility: HOSPITAL | Age: 56
LOS: 1 days | Discharge: HOME | End: 2021-12-26
Admitting: EMERGENCY MEDICINE
Payer: COMMERCIAL

## 2021-12-26 VITALS
TEMPERATURE: 98 F | RESPIRATION RATE: 18 BRPM | OXYGEN SATURATION: 97 % | HEART RATE: 89 BPM | DIASTOLIC BLOOD PRESSURE: 72 MMHG | WEIGHT: 294.98 LBS | SYSTOLIC BLOOD PRESSURE: 120 MMHG | HEIGHT: 74 IN

## 2021-12-26 DIAGNOSIS — J02.9 ACUTE PHARYNGITIS, UNSPECIFIED: ICD-10-CM

## 2021-12-26 DIAGNOSIS — Z91.013 ALLERGY TO SEAFOOD: ICD-10-CM

## 2021-12-26 DIAGNOSIS — Z91.041 RADIOGRAPHIC DYE ALLERGY STATUS: ICD-10-CM

## 2021-12-26 DIAGNOSIS — Z20.822 CONTACT WITH AND (SUSPECTED) EXPOSURE TO COVID-19: ICD-10-CM

## 2021-12-26 DIAGNOSIS — I10 ESSENTIAL (PRIMARY) HYPERTENSION: ICD-10-CM

## 2021-12-26 LAB
FLUAV AG NPH QL: SIGNIFICANT CHANGE UP
FLUBV AG NPH QL: SIGNIFICANT CHANGE UP
RSV RNA NPH QL NAA+NON-PROBE: SIGNIFICANT CHANGE UP
S PYO AG SPEC QL IA: NEGATIVE — SIGNIFICANT CHANGE UP
SARS-COV-2 RNA SPEC QL NAA+PROBE: SIGNIFICANT CHANGE UP

## 2021-12-26 PROCEDURE — 99284 EMERGENCY DEPT VISIT MOD MDM: CPT

## 2021-12-26 PROCEDURE — 87880 STREP A ASSAY W/OPTIC: CPT

## 2021-12-26 PROCEDURE — 99283 EMERGENCY DEPT VISIT LOW MDM: CPT

## 2021-12-26 PROCEDURE — 87081 CULTURE SCREEN ONLY: CPT

## 2021-12-26 PROCEDURE — 87637 SARSCOV2&INF A&B&RSV AMP PRB: CPT

## 2021-12-26 NOTE — ED PROVIDER NOTE - OBJECTIVE STATEMENT
56y m without significan pmhx  c/o 3 days sore throat, pain on swallowing but no difficulty getting food/liquid down.  pain more on right.  symptoms mild. hot tea this morning helped.  no fever/chills, headache, nasal sylvia, cough, CP, SOB, abd pain, NVD.    vaccinated for covid with booster.

## 2021-12-26 NOTE — ED ADULT TRIAGE NOTE - CHIEF COMPLAINT QUOTE
Pt co sore throat x2 weeks, says sx are getting better but would like to be evaluated. Tolerating own secretions, speaking clearly and coherently. Denies f/c/ cough.

## 2021-12-26 NOTE — ED PROVIDER NOTE - WET READ LAUNCH FT
RN NOTE



O2 TANK AND O2 CONCENTRATOR RECEIVED FROM [facility]a TO BE TAKEN HOME BY PATIENT. PATIENT 
GIVEN DISCHARGE PACKET, AND DISCHARGE INSTRUCTIONS. VERBALIZED UNDERSTANDING. IV REMOVED, 
CATHETER INTACT AND SITE BENIGN. PRESSURE AND 2X2 APPLIED. NO BLEEDING NOTED. PATIENT SENT 
HOME VIA WHEELCHAIR, PICKED BY FAMILY ON PRIVATE VEHICLE, IN STABLE CONDITION. There are no Wet Read(s) to document.

## 2021-12-26 NOTE — ED PROVIDER NOTE - NSFOLLOWUPINSTRUCTIONS_ED_ALL_ED_FT
Pharyngitis    WHAT YOU NEED TO KNOW:    Pharyngitis, or sore throat, is inflammation of the tissues and structures in your pharynx (throat). Pharyngitis is most often caused by bacteria. It may also be caused by a cold or flu virus. Other causes include smoking, allergies, or acid reflux.     DISCHARGE INSTRUCTIONS:    Call 911 for any of the following:   •You have trouble breathing or swallowing because your throat is swollen or sore.          Return to the emergency department if:   •You are drooling because it hurts too much to swallow.      •Your fever is higher than 102°F (39°C) or lasts longer than 3 days.      •You are confused.      •You taste blood in your throat.      Contact your healthcare provider if:   •Your throat pain gets worse.      •You have a painful lump in your throat that does not go away after 5 days.      •Your symptoms do not improve after 5 days.      •You have questions or concerns about your condition or care.      Medicines: Viral pharyngitis will go away on its own without treatment. Your sore throat should start to feel better in 3 to 5 days for both viral and bacterial infections. You may need any of the following:   •Antibiotics treat a bacterial infection.      •NSAIDs, such as ibuprofen, help decrease swelling, pain, and fever. NSAIDs can cause stomach bleeding or kidney problems in certain people. If you take blood thinner medicine, always ask your healthcare provider if NSAIDs are safe for you. Always read the medicine label and follow directions.      •Acetaminophen decreases pain and fever. It is available without a doctor's order. Ask how much to take and how often to take it. Follow directions. Acetaminophen can cause liver damage if not taken correctly.      •Take your medicine as directed. Contact your healthcare provider if you think your medicine is not helping or if you have side effects. Tell him or her if you are allergic to any medicine. Keep a list of the medicines, vitamins, and herbs you take. Include the amounts, and when and why you take them. Bring the list or the pill bottles to follow-up visits. Carry your medicine list with you in case of an emergency.      Manage your symptoms:   •Gargle salt water. Mix ¼ teaspoon salt in an 8 ounce glass of warm water and gargle. This may help decrease swelling in your throat.      •Drink liquids as directed. You may need to drink more liquids than usual. Liquids may help soothe your throat and prevent dehydration. Ask how much liquid to drink each day and which liquids are best for you.      •Use a cool-steam humidifier to help moisten the air in your room and calm your cough.      •Soothe your throat with cough drops, ice, soft foods, or popsicles.      Prevent the spread of pharyngitis: Cover your mouth and nose when you cough or sneeze. Do not share food or drinks. Wash your hands often. Use soap and water. If soap and water are unavailable, use an alcohol based hand .     Follow up with your doctor as directed: Write down your questions so you remember to ask them during your visits.

## 2021-12-26 NOTE — ED PROVIDER NOTE - PATIENT PORTAL LINK FT
You can access the FollowMyHealth Patient Portal offered by [facility] by registering at the following website: [email or web address]. By joining [facility]’s FollowMyHealth portal, you will also be able to view your health information using other applications (apps) compatible with our system.

## 2021-12-26 NOTE — ED PROVIDER NOTE - CLINICAL SUMMARY MEDICAL DECISION MAKING FREE TEXT BOX
pharyngitis, likely viral.  no evid of PTA, RPA, or tonsillitis.  symptoms mild.  pt declined pain meds.  will test for flu, covid, GABHS.

## 2021-12-26 NOTE — ED PROVIDER NOTE - PHYSICAL EXAMINATION
CONSTITUTIONAL: NAD   SKIN: Normal color and turgor.    HEAD: NC/AT.  EYES: Conjunctiva clear. EOMI. PERRL.    ENT: Airway clear. Normal voice.  No tonsillar swelling, no exudate. Uvula midline without swelling.  No intraoral swelling. No elev of FOM.  No cervical ANDREW.  RESPIRATORY:  Normal work of breathing. Lungs CTAB.  CARDIOVASCULAR:  RRR, S1S2. No M/R/G.      GI:  Abdomen soft, nontender.    MSK: Neck supple.  No lower extremity edema or calf tenderness.  No joint swelling or ROM limitation.  NEURO: Alert and oriented; CN II-XII grossly intact. Speech clear. 5/5 strength in all extremities.  Good balance. Steady gait.

## 2022-12-07 NOTE — ED ADULT NURSE NOTE - NS PRO AD PATIENT TYPE ON CHART
Addended by: SATHYA DUMONT on: 12/7/2022 03:16 PM     Modules accepted: Orders     Health Care Proxy (HCP)

## 2023-06-07 NOTE — ED ADULT NURSE NOTE - CAS DISCH BELONGINGS RETURNED
[FreeTextEntry1] : rash\par seb derm\par diflucan 200 mg PO daily x 4 days; SED\par clobetasol solution PRN; SED\par \par ISK\par The specified lesions were treated with liquid nitrogen cryotherapy.  Discussed risks including pain, blistering, crusting, discoloration, recurrence.\par \par xerosis\par c./w eucerin\par \par  Not applicable

## 2023-08-05 ENCOUNTER — EMERGENCY (EMERGENCY)
Facility: HOSPITAL | Age: 58
LOS: 1 days | Discharge: HOME | End: 2023-08-05
Admitting: EMERGENCY MEDICINE
Payer: COMMERCIAL

## 2023-08-05 VITALS
HEART RATE: 95 BPM | DIASTOLIC BLOOD PRESSURE: 93 MMHG | OXYGEN SATURATION: 97 % | HEIGHT: 74 IN | TEMPERATURE: 98 F | SYSTOLIC BLOOD PRESSURE: 133 MMHG | RESPIRATION RATE: 20 BRPM | WEIGHT: 291.01 LBS

## 2023-08-05 DIAGNOSIS — U07.1 COVID-19: ICD-10-CM

## 2023-08-05 DIAGNOSIS — R05.2 SUBACUTE COUGH: ICD-10-CM

## 2023-08-05 DIAGNOSIS — E78.5 HYPERLIPIDEMIA, UNSPECIFIED: ICD-10-CM

## 2023-08-05 DIAGNOSIS — Z85.72 PERSONAL HISTORY OF NON-HODGKIN LYMPHOMAS: ICD-10-CM

## 2023-08-05 DIAGNOSIS — I10 ESSENTIAL (PRIMARY) HYPERTENSION: ICD-10-CM

## 2023-08-05 DIAGNOSIS — Z91.013 ALLERGY TO SEAFOOD: ICD-10-CM

## 2023-08-05 DIAGNOSIS — Z91.041 RADIOGRAPHIC DYE ALLERGY STATUS: ICD-10-CM

## 2023-08-05 LAB
RAPID RVP RESULT: DETECTED
SARS-COV-2 RNA SPEC QL NAA+PROBE: DETECTED

## 2023-08-05 PROCEDURE — 71046 X-RAY EXAM CHEST 2 VIEWS: CPT | Mod: 26

## 2023-08-05 PROCEDURE — 0225U NFCT DS DNA&RNA 21 SARSCOV2: CPT

## 2023-08-05 PROCEDURE — 94640 AIRWAY INHALATION TREATMENT: CPT

## 2023-08-05 PROCEDURE — 99283 EMERGENCY DEPT VISIT LOW MDM: CPT | Mod: 25

## 2023-08-05 PROCEDURE — 71046 X-RAY EXAM CHEST 2 VIEWS: CPT

## 2023-08-05 PROCEDURE — 99284 EMERGENCY DEPT VISIT MOD MDM: CPT

## 2023-08-05 RX ORDER — ALBUTEROL 90 UG/1
1 AEROSOL, METERED ORAL ONCE
Refills: 0 | Status: DISCONTINUED | OUTPATIENT
Start: 2023-08-05 | End: 2023-08-08

## 2023-08-05 RX ORDER — IPRATROPIUM/ALBUTEROL SULFATE 18-103MCG
3 AEROSOL WITH ADAPTER (GRAM) INHALATION ONCE
Refills: 0 | Status: COMPLETED | OUTPATIENT
Start: 2023-08-05 | End: 2023-08-05

## 2023-08-05 RX ADMIN — Medication 3 MILLILITER(S): at 11:43

## 2023-08-05 NOTE — ED PROVIDER NOTE - PATIENT PORTAL LINK FT
You can access the FollowMyHealth Patient Portal offered by [facility] by registering at the following website: [email or web address]. By joining [facility]’s FollowMyHealth portal, you will also be able to view your health information using other applications (apps) compatible with our system.

## 2023-08-05 NOTE — ED PROVIDER NOTE - OBJECTIVE STATEMENT
58yo male with pmhx of HLD presents with 1mo cough. Pt reports he had "the flu" at onset of symptoms. He states all symptoms resolved aside from persistent cough. Occasionally produces clear sputum. Cough worse with deep inspiration. Felt feverish again last week. This improves with tylenol. He denies ear pain, nasal congestion, sore throat, shortness of breath, chest pain, abd pain, N/V/D, h/o asthma/COPD, h/o seasonal allergies, smoking history.
normal...

## 2023-08-05 NOTE — ED ADULT NURSE NOTE - NSFALLUNIVINTERV_ED_ALL_ED
Bed/Stretcher in lowest position, wheels locked, appropriate side rails in place/Call bell, personal items and telephone in reach/Instruct patient to call for assistance before getting out of bed/chair/stretcher/Non-slip footwear applied when patient is off stretcher/[place] to call system/Physically safe environment - no spills, clutter or unnecessary equipment/Purposeful proactive rounding/Room/bathroom lighting operational, light cord in reach

## 2023-08-05 NOTE — ED PROVIDER NOTE - NS ED ROS FT
Constitutional: No fever. No chills.  Eyes: No redness. No discharge. No vision change.   ENT: No sore throat. No ear pain.  Cardiovascular: No chest pain. No leg swelling.  Respiratory: +cough. No shortness of breath.  GI: No abdominal pain. No vomiting. No diarrhea.   MSK: No joint pain. No back pain.   Skin: No rash. No abrasions.   Neuro: No numbness. No weakness.   Psych: No known mental health issues.

## 2023-08-05 NOTE — ED ADULT NURSE NOTE - NSFALLASSESSNEED_ED_ALL_ED
-1 stat dose of Heparin 5000 units SQ ordered  -no vte ppx, awaiting surgery evaluation  -Dulcolax for GI ppx no -Abdominal pain, swelling 2/2 possible surgical site infection s/p abdominoplasty. CT abd&pelvis showed diffuse subcutaneous truncal edema/induration may reflect cellulitis. [place] subcutaneous fluid collection mid to lower anterior abdominal wall   may represent seroma or abscess. No intraperitoneal collections.  -admit to F  -NPO for now  -Continue IV Zosyn q8hrs over 4 hours   -IVF NS @ 75 cc/hr, stop after 6 hours   -Zofran prn for nausea   -Surgery consult, f/u recs Dr. Catalan   -Plastic surgery consult, f/u recs Dr. Mehta  -f/u wound cx, blood cx, urine cx   -fall precautions  -out of bed with assistance

## 2023-08-05 NOTE — ED PROVIDER NOTE - NSFOLLOWUPINSTRUCTIONS_ED_ALL_ED_FT
FEVER:   Take tylenol 650mg up to four times daily for fever. You can also take ibuprofen 600mg up to three times daily. Keeping your fever down will help you feel better overall.    CONGESTION:   Use a saline nasal spray to help keep nasal passages moist and allow you to blow your nose easier.  Using a neti pot (a sinus irrigation system) may also provide relief.      COUGH:  Take one tab of tessalon perles three times daily for cough.   Use 2 puffs of inhaler up to 6 times daily for cough and chest tightness.     OTHER:  Take small, continuous sips of clear fluids to stay well hydrated (water, gatorade, pedialyte).  Eat bland foods (toast, rice, broth).  You will receive the results of your respiratory viral testing.    Return to the Emergency Department if you develop chest pain, shortness of breath, difficulty eating or drinking due to vomiting, or any other concerns.

## 2023-08-05 NOTE — ED ADULT TRIAGE NOTE - CHIEF COMPLAINT QUOTE
Pt presents to ED c/o cough and for 1 month w/ clear mucus. Pt A&Ox4. Pt is conversive in full sentences and has a steady gait. Pt reports had colds first but the coughs hasnt gotten better. Has been taking tylenol but no relief. Pt has hx of HDL. Pt denies headache, cp, or sob at present.

## 2023-08-05 NOTE — ED PROVIDER NOTE - CLINICAL SUMMARY MEDICAL DECISION MAKING FREE TEXT BOX
Pt afebrile and hemodynamically stable. No hypoxia on room air. Given duoneb treatment in ED with improvement. CXR without acute cardiopulmonary abnormality. RVP sent and pending. Counseled on supportive care. Has pmd for follow up. Return precautions given.

## 2024-03-22 NOTE — ED ADULT NURSE NOTE - CHIEF COMPLAINT
Float RN-presents in resp distress RR 33. Respirations even and labored with equal chest rise bilaterally. Spo2 100 on NRB. + accessory muscle use. family endorsing low grade fevers. Sinus tach on cardiac monitor.  No complaints of, headache, nausea, dizziness, vomiting, chills verbalized. phx Emphysema. 20G Iv Placed to RAC and left forearm. Pt endorsed to primary RN veronica
The patient is a 54y Male complaining of abdominal pain.

## 2024-05-27 NOTE — DISCHARGE NOTE PROVIDER - HOSPITAL COURSE
56 y/o male with PMH s/f Lymphoma in remission(s/p chemo), GERD, HLD presenting to ED with c/o RUQ pain of 4 days duration. Sharp pain to RUQ with nausea and vomiting. RUQ today s/f only CBD dilation to 9 mm. On admission GI was consulted. The patient had a CT which showed no gallstones in gallbladder or bile duet and CBD of 0.6cm. On 5/28 the patient had an EGD which showed severe grade distal esophagitis, hiatal hernia and gastritis. The patient was started on carafate QID and PPI BID. On 5/29 the patient was started on CLD and the patient began passing flatus and having loose bowel movements. On 6/1 the patient was advanced to soft diet which was tolerated.     Vaginal Delivery

## 2024-09-28 ENCOUNTER — EMERGENCY (EMERGENCY)
Facility: HOSPITAL | Age: 59
LOS: 1 days | Discharge: HOME | End: 2024-09-28
Attending: EMERGENCY MEDICINE | Admitting: EMERGENCY MEDICINE
Payer: COMMERCIAL

## 2024-09-28 VITALS — SYSTOLIC BLOOD PRESSURE: 157 MMHG | HEART RATE: 73 BPM | DIASTOLIC BLOOD PRESSURE: 91 MMHG | TEMPERATURE: 98 F

## 2024-09-28 VITALS
WEIGHT: 285.06 LBS | DIASTOLIC BLOOD PRESSURE: 75 MMHG | OXYGEN SATURATION: 100 % | SYSTOLIC BLOOD PRESSURE: 122 MMHG | RESPIRATION RATE: 18 BRPM | HEIGHT: 74 IN | HEART RATE: 80 BPM | TEMPERATURE: 98 F

## 2024-09-28 LAB
APPEARANCE UR: CLEAR — SIGNIFICANT CHANGE UP
BILIRUB UR-MCNC: NEGATIVE — SIGNIFICANT CHANGE UP
COLOR SPEC: YELLOW — SIGNIFICANT CHANGE UP
DIFF PNL FLD: NEGATIVE — SIGNIFICANT CHANGE UP
GLUCOSE UR QL: NEGATIVE MG/DL — SIGNIFICANT CHANGE UP
KETONES UR-MCNC: NEGATIVE MG/DL — SIGNIFICANT CHANGE UP
LEUKOCYTE ESTERASE UR-ACNC: NEGATIVE — SIGNIFICANT CHANGE UP
NITRITE UR-MCNC: NEGATIVE — SIGNIFICANT CHANGE UP
PH UR: 7 — SIGNIFICANT CHANGE UP (ref 5–8)
PROT UR-MCNC: NEGATIVE MG/DL — SIGNIFICANT CHANGE UP
SP GR SPEC: 1.01 — SIGNIFICANT CHANGE UP (ref 1–1.03)
UROBILINOGEN FLD QL: 1 MG/DL — SIGNIFICANT CHANGE UP (ref 0.2–1)

## 2024-09-28 PROCEDURE — 83690 ASSAY OF LIPASE: CPT

## 2024-09-28 PROCEDURE — 74177 CT ABD & PELVIS W/CONTRAST: CPT | Mod: 26,MC

## 2024-09-28 PROCEDURE — 85025 COMPLETE CBC W/AUTO DIFF WBC: CPT

## 2024-09-28 PROCEDURE — 96375 TX/PRO/DX INJ NEW DRUG ADDON: CPT

## 2024-09-28 PROCEDURE — 96374 THER/PROPH/DIAG INJ IV PUSH: CPT

## 2024-09-28 PROCEDURE — 99284 EMERGENCY DEPT VISIT MOD MDM: CPT | Mod: 25

## 2024-09-28 PROCEDURE — 36415 COLL VENOUS BLD VENIPUNCTURE: CPT

## 2024-09-28 PROCEDURE — 81003 URINALYSIS AUTO W/O SCOPE: CPT

## 2024-09-28 PROCEDURE — 80053 COMPREHEN METABOLIC PANEL: CPT

## 2024-09-28 PROCEDURE — 74177 CT ABD & PELVIS W/CONTRAST: CPT | Mod: MC

## 2024-09-28 PROCEDURE — 99285 EMERGENCY DEPT VISIT HI MDM: CPT

## 2024-09-28 RX ORDER — METHYLPREDNISOLONE 4 MG
40 TABLET ORAL ONCE
Refills: 0 | Status: COMPLETED | OUTPATIENT
Start: 2024-09-28 | End: 2024-09-28

## 2024-09-28 RX ORDER — DIPHENHYDRAMINE HCL 50 MG
50 CAPSULE ORAL ONCE
Refills: 0 | Status: COMPLETED | OUTPATIENT
Start: 2024-09-28 | End: 2024-09-28

## 2024-09-28 RX ORDER — ONDANSETRON 2 MG/ML
4 INJECTION, SOLUTION INTRAMUSCULAR; INTRAVENOUS ONCE
Refills: 0 | Status: COMPLETED | OUTPATIENT
Start: 2024-09-28 | End: 2024-09-28

## 2024-09-28 RX ADMIN — Medication 50 MILLIGRAM(S): at 12:15

## 2024-09-28 RX ADMIN — Medication 40 MILLIGRAM(S): at 09:59

## 2024-09-28 RX ADMIN — ONDANSETRON 4 MILLIGRAM(S): 2 INJECTION, SOLUTION INTRAMUSCULAR; INTRAVENOUS at 14:05

## 2024-09-28 NOTE — ED PROVIDER NOTE - ATTENDING APP SHARED VISIT CONTRIBUTION OF CARE
Pt is a 57yo m, h/o hld, gerd, b cell lymphoma '15 in remission. who p/w acute on chronic r sided abd pain. Getting worse in the past few months. + constipated in the past 2 months, had neg colonoscopy at the time and neg. Has been moving bowels. No f/c, n/v, cp, sob, palp, urinary sx's... Afebrile. HDS. + ruq and rlq tendernes, no g/r. + s1, s2, rrr. Lungs cta b/l. CT a/p neg for acute findings with no appy, cholelithiasis, cholecystitis... ED evaluation and management discussed with the patient in detail.  Close PMD follow up encouraged.  Strict ED return instructions discussed in detail and patient given the opportunity to ask any questions about their discharge diagnosis and instructions. Patient verbalized understanding.

## 2024-09-28 NOTE — ED PROVIDER NOTE - NSFOLLOWUPINSTRUCTIONS_ED_ALL_ED_FT
Thank you for visiting [facility] Emergency Department.      We saw you today for abdominal pain.   Your labs and CT scan were reassuring.    Please know that no emergency visit is complete without follow-up with your primary care provider in 1 week.  Please bring copies of all discharge papers and results and show to your doctor.      Please continue taking all previous medications as instructed unless we discussed otherwise.     I appreciated your patience and hope you feel better soon.     Return to ER immediately if you develop fevers, chills, chest pain, shortness of breath, worsening pain, vomiting, and/or any concerning symptoms.

## 2024-09-28 NOTE — ED ADULT NURSE NOTE - OBJECTIVE STATEMENT
Patient is a 58y/M came in with c/o R sided abd pain and constipation. denies any fever or chills, no chest pain, no SOB. pt is awake, alert, ox4.

## 2024-09-28 NOTE — ED PROVIDER NOTE - OBJECTIVE STATEMENT
59 y/o male w/ hx HLD, B cell lymphoma s/p rads/chemo in remission (2015), GERD p/w R sided abd pain.  States has had pain intermittently x approx 10 yrs, but notes has been progressively worsening x 7-8 mo, and worse in past few wks.  Denies clear modifying factors.  Currently 6/10, sharp, worse with palpation.  Also notes has been feeling increased constipation x 2 mo.  Last BM yesterday.  Had colonoscopy 2 mo ago, which was reportedly neg.  Reports also had recent ?RUQ sono, states "they could not see anything."  Does not have results with him.  Denies f/c, cough, cp, sob, n/v/d, dysuria, hematuria.  Denies prior abdominal surgeries.    Of note, had admission in 2020 for RUQ pain, found to have CBD dilation to 9mm, no stones. Had severe grade distal esophagitis, hiatal hernia and gastritis.

## 2024-09-28 NOTE — ED ADULT TRIAGE NOTE - SPO2 (%)
100 Doxycycline Counseling:  Patient counseled regarding possible photosensitivity and increased risk for sunburn.  Patient instructed to avoid sunlight, if possible.  When exposed to sunlight, patients should wear protective clothing, sunglasses, and sunscreen.  The patient was instructed to call the office immediately if the following severe adverse effects occur:  hearing changes, easy bruising/bleeding, severe headache, or vision changes.  The patient verbalized understanding of the proper use and possible adverse effects of doxycycline.  All of the patient's questions and concerns were addressed.

## 2024-09-28 NOTE — ED PROVIDER NOTE - PATIENT PORTAL LINK FT
You can access the FollowMyHealth Patient Portal offered by [facility] by registering at the following website: [email or web address]. By joining [employer]’s FollowMyHealth portal, you will also be able to view your health information using other applications (apps) compatible with our system.

## 2024-09-28 NOTE — ED PROVIDER NOTE - PHYSICAL EXAMINATION
CONSTITUTIONAL: Awake, alert.  Nontoxic, no acute distress.    HEAD: Normocephalic, atraumatic.    EYES: Conjunctivae clear without exudates or hemorrhage. Sclera is non-icteric.    ENT: Normal appearing external ears, nose, mucous membranes moist.    NECK: supple, trachea midline.    HEART:  Normal rate, regular rhythm.  Heart sounds S1, S2.  No murmurs, rubs or gallops.    LUNGS:  No acute respiratory distress.  Non-tachypneic and non-labored.  Lungs are clear bilaterally with good aeration.  No wheezing, rales, rhonchi.    ABDOMEN: Normal appearing skin without lesions, rashes.  Normal bowel sounds x 4.  Soft, non-distended, +R sided abd ttp (R upper > R lower). No rebound or guarding. No hernias or masses palpable.  No pulsatile abdominal mass.   No CVA tenderness b/l.    MUSCULOSKELETAL:  Moving all extremities without issue.    SKIN: Skin in warm, dry and intact without rashes or lesions.  Appropriate color for ethnicity.    NEUROLOGICAL:  Patient is alert, oriented x person, place and time.    PSYCH: Appropriate mood and affect. Good judgment and insight.

## 2024-09-28 NOTE — ED PROVIDER NOTE - CLINICAL SUMMARY MEDICAL DECISION MAKING FREE TEXT BOX
59 y/o male w/ hx HLD, B cell lymphoma s/p rads/chemo in remission (2015), GERD p/w R sided abd pain.  States has had pain intermittently x approx 10 yrs, but notes has been progressively worsening x 7-8 mo, and worse in past few wks.  Denies clear modifying factors.  Currently 6/10, sharp, worse with palpation.  Also notes has been feeling increased constipation x 2 mo.  Last BM yesterday.  Had colonoscopy 2 mo ago, which was reportedly neg.  Reports also had recent ?RUQ sono, states "they could not see anything."  Does not have results with him.  Denies f/c, cough, cp, sob, n/v/d, dysuria, hematuria.  Denies prior abdominal surgeries.  Of note, had admission in 2020 for RUQ pain, found to have CBD dilation to 9mm, no stones. Had severe grade distal esophagitis, hiatal hernia and gastritis.  VSS  Exam with + diffuse ttp R sided abd (upper > lower), but present in lower abd as well  Plan for labs, ua, ctap  Pain meds prn, reeval

## 2024-10-01 DIAGNOSIS — K59.00 CONSTIPATION, UNSPECIFIED: ICD-10-CM

## 2024-10-01 DIAGNOSIS — Z91.041 RADIOGRAPHIC DYE ALLERGY STATUS: ICD-10-CM

## 2024-10-01 DIAGNOSIS — E78.5 HYPERLIPIDEMIA, UNSPECIFIED: ICD-10-CM

## 2024-10-01 DIAGNOSIS — Z87.19 PERSONAL HISTORY OF OTHER DISEASES OF THE DIGESTIVE SYSTEM: ICD-10-CM

## 2024-10-01 DIAGNOSIS — K21.9 GASTRO-ESOPHAGEAL REFLUX DISEASE WITHOUT ESOPHAGITIS: ICD-10-CM

## 2024-10-01 DIAGNOSIS — Z88.8 ALLERGY STATUS TO OTHER DRUGS, MEDICAMENTS AND BIOLOGICAL SUBSTANCES: ICD-10-CM

## 2025-05-21 ENCOUNTER — EMERGENCY (EMERGENCY)
Facility: HOSPITAL | Age: 60
LOS: 1 days | End: 2025-05-21
Admitting: EMERGENCY MEDICINE
Payer: COMMERCIAL

## 2025-05-21 VITALS
TEMPERATURE: 98 F | OXYGEN SATURATION: 100 % | WEIGHT: 279.99 LBS | HEART RATE: 77 BPM | SYSTOLIC BLOOD PRESSURE: 130 MMHG | RESPIRATION RATE: 18 BRPM | DIASTOLIC BLOOD PRESSURE: 80 MMHG

## 2025-05-21 PROCEDURE — 73030 X-RAY EXAM OF SHOULDER: CPT

## 2025-05-21 PROCEDURE — 73000 X-RAY EXAM OF COLLAR BONE: CPT | Mod: 26,RT

## 2025-05-21 PROCEDURE — 73000 X-RAY EXAM OF COLLAR BONE: CPT

## 2025-05-21 PROCEDURE — 73030 X-RAY EXAM OF SHOULDER: CPT | Mod: 26,RT

## 2025-05-21 PROCEDURE — 99284 EMERGENCY DEPT VISIT MOD MDM: CPT | Mod: 25

## 2025-05-21 PROCEDURE — 99284 EMERGENCY DEPT VISIT MOD MDM: CPT

## 2025-05-21 RX ORDER — IBUPROFEN 200 MG
1 TABLET ORAL
Qty: 15 | Refills: 0
Start: 2025-05-21 | End: 2025-05-25

## 2025-05-21 NOTE — ED PROVIDER NOTE - NSPTACCESSSVCSAPPTDETAILS_ED_ALL_ED_FT
Take ibuprofen as needed for pain.  Follow-up with your primary care provider as needed for further evaluation and management.

## 2025-05-21 NOTE — ED PROVIDER NOTE - TEMPLATE, MLM
Tysabri form received and placed in Dr. Wilson's folder for review and signature.   Brennan Aguirre EMT 03/20/2023 10:04AM   
Tysabri form signed and faxed back at [phone]   Brennan AQUINO 03/23/2023 3:08PM   
Orthopedic

## 2025-05-21 NOTE — ED ADULT NURSE NOTE - NSFALLUNIVINTERV_ED_ALL_ED
Bed/Stretcher in lowest position, wheels locked, appropriate side rails in place/Call bell, personal items and telephone in reach/Instruct patient to call for assistance before getting out of bed/chair/stretcher/Non-slip footwear applied when patient is off stretcher/[place] to call system/Physically safe environment - no spills, clutter or unnecessary equipment/Purposeful proactive rounding/Room/bathroom lighting operational, light cord in reach

## 2025-05-21 NOTE — ED PROVIDER NOTE - CARE PLAN
1 Principal Discharge DX:	Contusion of right clavicle  Secondary Diagnosis:	Strain of shoulder, right

## 2025-05-21 NOTE — ED PROVIDER NOTE - MUSCULOSKELETAL MINIMAL EXAM
tenderness over supraspinatous right shoulder and right mid clavicle/normal range of motion/TENDERNESS

## 2025-05-21 NOTE — ED PROVIDER NOTE - SKIN NEGATIVE STATEMENT, MLM
impaired ability to control trunk for mobility/decreased ability to use arms for pushing/pulling/decreased ability to use legs for bridging/pushing no abrasions, no jaundice, no lesions, no pruritis, and no rashes.

## 2025-05-21 NOTE — ED PROVIDER NOTE - CLINICAL SUMMARY MEDICAL DECISION MAKING FREE TEXT BOX
Patient is a 59-year-old male, is presented to ED status post fall x 1 day.  Patient was seen and examined and had an x-ray of his right shoulder and clavicle which were unremarkable for fracture.  Patient will be discharged with a prescription for ibuprofen and instructed to follow-up with his primary care as needed for further evaluation and management.

## 2025-05-21 NOTE — ED PROVIDER NOTE - OBJECTIVE STATEMENT
Patient is a 59-year-old male, presents to ED status post fall x 1 day.  Patient states that he was climbing marble steps yesterday and slipped on water falling hitting his right shoulder and injuring his shoulder and clavicle.  Patient denies hitting his head or any midline C-spine tenderness. Pt took Tylenol PTA

## 2025-05-21 NOTE — ED PROVIDER NOTE - NSFOLLOWUPINSTRUCTIONS_ED_ALL_ED_FT
Contusion  A contusion is a deep bruise. Contusions are the result of a blunt injury to tissues and muscle fibers under the skin. The injury causes bleeding under the skin. The skin over the contusion may turn blue, purple, or yellow. Minor injuries will give you a painless contusion, but more severe injuries cause contusions that can stay painful and swollen for a few weeks.    Follow these instructions at home:  Pay attention to any changes in your symptoms. Let your health care provider know about them. Take these actions to relieve your pain.    Managing pain, stiffness, and swelling    Bag of ice on a towel on the skin.  Use resting, icing, applying pressure (compression), and raising (elevating) the injured area. This is often called the RICE method.  Rest the injured area. Return to your normal activities as told by your health care provider. Ask your health care provider what activities are safe for you.  If directed, put ice on the injured area. To do this:  Put ice in a plastic bag.  Place a towel between your skin and the bag.  Leave the ice on for 20 minutes, 2–3 times a day.  If your skin turns bright red, remove the ice right away to prevent skin damage. The risk of skin damage is higher if you cannot feel pain, heat, or cold.  If directed, apply light compression to the injured area using an elastic bandage. Make sure the bandage is not wrapped too tightly. Remove and reapply the bandage as directed by your health care provider.  If possible, elevate the injured area above the level of your heart while you are sitting or lying down.  General instructions    Take over-the-counter and prescription medicines only as told by your health care provider.  Keep all follow-up visits. Your health care provider may want to see how your contusion is healing with treatment.  Contact a health care provider if:  Your symptoms do not improve after several days of treatment.  Your symptoms get worse.  You have difficulty moving the injured area.  Get help right away if:  You have severe pain.  You have numbness in a hand or foot.  Your hand or foot turns pale or cold.  This information is not intended to replace advice given to you by your health care provider. Make sure you discuss any questions you have with your health care provider.    Document Revised: 06/05/2023 Document Reviewed: 06/05/2023  Elsevier Patient Education © 2025 [employer]    Shoulder Sprain  Body outline showing the skeleton, with a close-up of ligaments in the shoulder.  A shoulder sprain is a partial or complete tear in one of the tough, fiber-like tissues (ligaments) in the shoulder. The ligaments in the shoulder help to hold the shoulder in place.    What are the causes?  This condition may be caused by:  A fall.  A hit to the shoulder.  A twist of the arm.  What increases the risk?  You are more likely to develop this condition if you:  Play sports.  Have problems with balance or coordination.  What are the signs or symptoms?  Symptoms of this condition include:  Pain when moving the shoulder.  Limited ability to move the shoulder.  Swelling and tenderness on top of the shoulder.  Warmth in the shoulder.  A change in the shape of the shoulder.  Redness or bruising on the shoulder.  How is this diagnosed?  This condition is diagnosed with:  A physical exam. During the exam, you may be asked to do simple exercises with your shoulder.  Imaging tests such as X-rays, MRI, or a CT scan. These tests can show how severe the sprain is.  How is this treated?  This condition may be treated with:  Rest.  Pain medicine.  Ice.  A sling or brace. This is used to keep the arm still while the shoulder is healing.  Physical therapy or rehabilitation exercises. These help to improve the range of motion and strength of the shoulder.  Surgery (rare). Surgery may be needed if the sprain caused a joint to become unstable. Surgery may also be needed to reduce pain.  Some people may develop ongoing shoulder pain or lose some range of motion in the shoulder. However, most people do not develop long-term problems.    Follow these instructions at home:  Bag of ice on a towel on the skin.  If you have a removable sling or brace:    Wear the sling or brace as told by your health care provider. Remove it only as told by your provider.  Check the skin around the sling or brace every day. Tell your provider about any concerns.  Loosen the sling or brace if your fingers tingle, become numb, or turn cold and blue.  Keep the sling or brace clean.  If the sling or brace is not waterproof:  Do not let it get wet.  Remove the sling or brace before taking a bath or shower, as told by your provider.  Activity    Rest your shoulder.  Move your arm only as much as told by your provider, but move your hand and fingers often to prevent stiffness and swelling.  Return to your normal activities as told by your provider. Ask your provider what activities are safe for you.  Ask your provider when it is safe for you to drive if you have a sling or brace on your shoulder.  If you were shown how to do any exercises, do them as told by your provider.  General instructions    If told, put ice on the affected area.  If you have a removable sling or brace, remove it as told by your provider.  Put ice in a plastic bag.  Place a towel between your skin and the bag.  Leave the ice on for 20 minutes, 2–3 times a day.  If your skin turns bright red, remove the ice right away to prevent skin damage. The risk of damage is higher if you cannot feel pain, heat, or cold.  Take over-the-counter and prescription medicines only as told by your provider.  Do not use any products that contain nicotine or tobacco. These products include cigarettes, chewing tobacco, and vaping devices, such as e-cigarettes. These can delay healing. If you need help quitting, ask your provider.  Keep all follow-up visits. Your provider will monitor your injury and activity level.  Contact a health care provider if:  Your pain gets worse.  Your pain is not relieved with medicines.  You have increased redness or swelling.  You have a fever.  Get help right away if:  You cannot move your arm or shoulder.  You develop severe numbness or tingling in your arm, hand, or fingers.  Your arm, hand, or fingers feel cold and turn blue, white, or gray.  This information is not intended to replace advice given to you by your health care provider. Make sure you discuss any questions you have with your health care provider.    Document Revised: 08/02/2023 Document Reviewed: 08/02/2023  Elsevier Patient Education © 2025 [employer]

## 2025-05-21 NOTE — ED PROVIDER NOTE - PATIENT PORTAL LINK FT
You can access the FollowMyHealth Patient Portal offered by [facility] by registering at the following website: [email or web address]. By joining [facility]’s FollowMyHealth portal, you will also be able to view your health information using other applications (apps) compatible with our system.
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
## 2025-05-22 DIAGNOSIS — Z91.013 ALLERGY TO SEAFOOD: ICD-10-CM

## 2025-05-22 DIAGNOSIS — Z88.8 ALLERGY STATUS TO OTHER DRUGS, MEDICAMENTS AND BIOLOGICAL SUBSTANCES: ICD-10-CM

## 2025-05-22 DIAGNOSIS — S46.911A STRAIN OF UNSPECIFIED MUSCLE, FASCIA AND TENDON AT SHOULDER AND UPPER ARM LEVEL, RIGHT ARM, INITIAL ENCOUNTER: ICD-10-CM

## 2025-05-22 DIAGNOSIS — M25.511 PAIN IN RIGHT SHOULDER: ICD-10-CM

## 2025-05-22 DIAGNOSIS — Z91.041 RADIOGRAPHIC DYE ALLERGY STATUS: ICD-10-CM

## 2025-06-15 ENCOUNTER — EMERGENCY (EMERGENCY)
Facility: HOSPITAL | Age: 60
LOS: 1 days | End: 2025-06-15
Attending: STUDENT IN AN ORGANIZED HEALTH CARE EDUCATION/TRAINING PROGRAM | Admitting: STUDENT IN AN ORGANIZED HEALTH CARE EDUCATION/TRAINING PROGRAM
Payer: COMMERCIAL

## 2025-06-15 VITALS
DIASTOLIC BLOOD PRESSURE: 92 MMHG | HEART RATE: 99 BPM | RESPIRATION RATE: 16 BRPM | WEIGHT: 285.06 LBS | SYSTOLIC BLOOD PRESSURE: 155 MMHG | OXYGEN SATURATION: 97 % | TEMPERATURE: 98 F | HEIGHT: 74 IN

## 2025-06-15 PROCEDURE — 99283 EMERGENCY DEPT VISIT LOW MDM: CPT

## 2025-06-15 RX ORDER — CEPHALEXIN 250 MG/1
1 CAPSULE ORAL
Qty: 40 | Refills: 0
Start: 2025-06-15 | End: 2025-06-24

## 2025-06-15 RX ORDER — CEPHALEXIN 250 MG/1
500 CAPSULE ORAL ONCE
Refills: 0 | Status: COMPLETED | OUTPATIENT
Start: 2025-06-15 | End: 2025-06-15

## 2025-06-15 RX ADMIN — CEPHALEXIN 500 MILLIGRAM(S): 250 CAPSULE ORAL at 19:13

## 2025-06-15 NOTE — ED ADULT NURSE NOTE - OBJECTIVE STATEMENT
pt 60y/o male w/swelling pain left jaw by ear x few days redness minor swelling noted no ear discharge no fever chills no nausea no vomiting no difficulty eating drinking,

## 2025-06-15 NOTE — ED ADULT NURSE NOTE - NSFALLUNIVINTERV_ED_ALL_ED
Bed/Stretcher in lowest position, wheels locked, appropriate side rails in place/Call bell, personal items and telephone in reach/Instruct patient to call for assistance before getting out of bed/chair/stretcher/Non-slip footwear applied when patient is off stretcher/[place] to call system/Physically safe environment - no spills, clutter or unnecessary equipment/Purposeful proactive rounding/Room/bathroom lighting operational, light cord in reach

## 2025-06-15 NOTE — ED PROVIDER NOTE - ENMT, MLM
Airway patent, Nasal mucosa clear. Mouth with normal mucosa. Throat has no vesicles, no oropharyngeal exudates and uvula is midline. TMs clear. Bilateral canals clear. Left cheek in the pre-auricular area with excoriated area with erythema and tenderness. No fluctuance or induration. Left posterior auricular lymphadenopathy

## 2025-06-15 NOTE — ED PROVIDER NOTE - CLINICAL SUMMARY MEDICAL DECISION MAKING FREE TEXT BOX
50 yo M w/ PMHx of lymphoma (2015, in remission) presents for evaluation of left ear pain. Patient is non-toxic, no acute distress, well-appearing, afebrile with no tachycardia or hypotension.     Ears are normal. TMs clear, canals clear. No AOM or AOE.     He has a small area of facial cellulitis to the left cheek by the ear. Patient thinks he had a mosquito bite that was very itchy. He scratched it. There are some excoriated areas. I think this has developed into cellulitis. No evidence of abscess or parotitis. Left neck lymph nodes tender, likely reactive.     Will treat with Keflex (no purulence) and have him recheck with his PCP.     Discussed plan, answered all questions, and addressed all concerns. Reviewed strict return precautions. Patient verbalized understanding and agreement with treatment plan, return precautions, and discharge instructions. Instructed patient to return for any new, worsening, or concerning symptoms.

## 2025-06-15 NOTE — ED ADULT NURSE NOTE - CAS EDN INTEG ASSESS
Attempted to contact the pt with the help of an  ([place]) Pt was to have had an INR today and was a no show.     Unable to leave message as the voice mail hasn't been set up yet.       Will inform Jayda MCCOY.   Patient was contacted and notified of results recommendations. Patient verbalized understanding and is agreeable with plan of care. He has no further questions at this time.     - - -

## 2025-06-15 NOTE — ED ADULT TRIAGE NOTE - CHIEF COMPLAINT QUOTE
left ear pain/swelling x 4 days, reports lymph node under the ear is also swollen   no fevers/chills, no decreased hearing

## 2025-06-15 NOTE — ED PROVIDER NOTE - PATIENT PORTAL LINK FT
You can access the FollowMyHealth Patient Portal offered by [facility] by registering at the following website: [email or web address]. By joining [facility] [employer] [facility]’s FollowMyHealth portal, you will also be able to view your health information using other applications (apps) compatible with our system.

## 2025-06-15 NOTE — ED PROVIDER NOTE - OBJECTIVE STATEMENT
50 yo M w/ PMHx of lymphoma (2015, in remission) presents for evaluation of left ear pain. Patient states that he thinks he had a mosquito bite a few days ago and he scratched it. Since then, he has had some redness and swelling to that area, some mild left ear pain, and swelling to his neck by his ear. No change to his hearing. No headache, neck pain, fever, congestion.

## 2025-06-17 DIAGNOSIS — H92.02 OTALGIA, LEFT EAR: ICD-10-CM

## 2025-06-17 DIAGNOSIS — Z91.013 ALLERGY TO SEAFOOD: ICD-10-CM

## 2025-06-17 DIAGNOSIS — Z91.041 RADIOGRAPHIC DYE ALLERGY STATUS: ICD-10-CM

## 2025-06-17 DIAGNOSIS — R59.0 LOCALIZED ENLARGED LYMPH NODES: ICD-10-CM
